# Patient Record
Sex: FEMALE | Race: WHITE
[De-identification: names, ages, dates, MRNs, and addresses within clinical notes are randomized per-mention and may not be internally consistent; named-entity substitution may affect disease eponyms.]

---

## 2017-02-19 ENCOUNTER — HOSPITAL ENCOUNTER (INPATIENT)
Dept: HOSPITAL 61 - ER | Age: 62
LOS: 1 days | Discharge: HOME | DRG: 313 | End: 2017-02-20
Attending: FAMILY MEDICINE | Admitting: FAMILY MEDICINE
Payer: COMMERCIAL

## 2017-02-19 VITALS — SYSTOLIC BLOOD PRESSURE: 125 MMHG | DIASTOLIC BLOOD PRESSURE: 80 MMHG

## 2017-02-19 VITALS — SYSTOLIC BLOOD PRESSURE: 143 MMHG | DIASTOLIC BLOOD PRESSURE: 92 MMHG

## 2017-02-19 VITALS — DIASTOLIC BLOOD PRESSURE: 74 MMHG | SYSTOLIC BLOOD PRESSURE: 116 MMHG

## 2017-02-19 VITALS — DIASTOLIC BLOOD PRESSURE: 75 MMHG | SYSTOLIC BLOOD PRESSURE: 138 MMHG

## 2017-02-19 VITALS — HEIGHT: 66 IN | BODY MASS INDEX: 33.34 KG/M2 | WEIGHT: 207.44 LBS

## 2017-02-19 DIAGNOSIS — Z82.49: ICD-10-CM

## 2017-02-19 DIAGNOSIS — Z79.899: ICD-10-CM

## 2017-02-19 DIAGNOSIS — R07.89: Primary | ICD-10-CM

## 2017-02-19 DIAGNOSIS — J44.9: ICD-10-CM

## 2017-02-19 DIAGNOSIS — F17.210: ICD-10-CM

## 2017-02-19 DIAGNOSIS — Z79.82: ICD-10-CM

## 2017-02-19 LAB
ALBUMIN SERPL-MCNC: 3.9 G/DL (ref 3.4–5)
ALBUMIN/GLOB SERPL: 1 {RATIO} (ref 1–1.7)
ALP SERPL-CCNC: 83 U/L (ref 46–116)
ALT SERPL-CCNC: 40 U/L (ref 14–59)
ANION GAP SERPL CALC-SCNC: 10 MMOL/L (ref 6–14)
AST SERPL-CCNC: 21 U/L (ref 15–37)
BASOPHILS # BLD AUTO: 0.1 X10^3/UL (ref 0–0.2)
BASOPHILS NFR BLD: 1 % (ref 0–3)
BILIRUB SERPL-MCNC: 0.3 MG/DL (ref 0.2–1)
BUN SERPL-MCNC: 13 MG/DL (ref 7–20)
BUN/CREAT SERPL: 16 (ref 6–20)
CALCIUM SERPL-MCNC: 10 MG/DL (ref 8.5–10.1)
CHLORIDE SERPL-SCNC: 102 MMOL/L (ref 98–107)
CO2 SERPL-SCNC: 29 MMOL/L (ref 21–32)
CREAT SERPL-MCNC: 0.8 MG/DL (ref 0.6–1)
EOSINOPHIL NFR BLD: 2 % (ref 0–3)
ERYTHROCYTE [DISTWIDTH] IN BLOOD BY AUTOMATED COUNT: 14.4 % (ref 11.5–14.5)
GFR SERPLBLD BASED ON 1.73 SQ M-ARVRAT: 72.9 ML/MIN
GLOBULIN SER-MCNC: 4.1 G/DL (ref 2.2–3.8)
GLUCOSE SERPL-MCNC: 119 MG/DL (ref 70–99)
HCT VFR BLD CALC: 44 % (ref 36–47)
HGB BLD-MCNC: 14.7 G/DL (ref 12–15.5)
LYMPHOCYTES # BLD: 3.4 X10^3/UL (ref 1–4.8)
LYMPHOCYTES NFR BLD AUTO: 37 % (ref 24–48)
MCH RBC QN AUTO: 29 PG (ref 25–35)
MCHC RBC AUTO-ENTMCNC: 34 G/DL (ref 31–37)
MCV RBC AUTO: 86 FL (ref 79–100)
MONOCYTES NFR BLD: 11 % (ref 0–9)
NEUTROPHILS NFR BLD AUTO: 49 % (ref 31–73)
PLATELET # BLD AUTO: 382 X10^3/UL (ref 140–400)
POTASSIUM SERPL-SCNC: 4 MMOL/L (ref 3.5–5.1)
PROT SERPL-MCNC: 8 G/DL (ref 6.4–8.2)
RBC # BLD AUTO: 5.14 X10^6/UL (ref 3.5–5.4)
SODIUM SERPL-SCNC: 141 MMOL/L (ref 136–145)
WBC # BLD AUTO: 9.2 X10^3/UL (ref 4–11)

## 2017-02-19 PROCEDURE — 71010: CPT

## 2017-02-19 PROCEDURE — 93005 ELECTROCARDIOGRAM TRACING: CPT

## 2017-02-19 PROCEDURE — A9500 TC99M SESTAMIBI: HCPCS

## 2017-02-19 PROCEDURE — 83880 ASSAY OF NATRIURETIC PEPTIDE: CPT

## 2017-02-19 PROCEDURE — 96376 TX/PRO/DX INJ SAME DRUG ADON: CPT

## 2017-02-19 PROCEDURE — 85027 COMPLETE CBC AUTOMATED: CPT

## 2017-02-19 PROCEDURE — 78452 HT MUSCLE IMAGE SPECT MULT: CPT

## 2017-02-19 PROCEDURE — 96374 THER/PROPH/DIAG INJ IV PUSH: CPT

## 2017-02-19 PROCEDURE — 80053 COMPREHEN METABOLIC PANEL: CPT

## 2017-02-19 PROCEDURE — 93017 CV STRESS TEST TRACING ONLY: CPT

## 2017-02-19 PROCEDURE — 84484 ASSAY OF TROPONIN QUANT: CPT

## 2017-02-19 PROCEDURE — 36415 COLL VENOUS BLD VENIPUNCTURE: CPT

## 2017-02-19 RX ADMIN — MORPHINE SULFATE PRN MG: 2 INJECTION, SOLUTION INTRAMUSCULAR; INTRAVENOUS at 17:42

## 2017-02-19 RX ADMIN — MORPHINE SULFATE PRN MG: 2 INJECTION, SOLUTION INTRAMUSCULAR; INTRAVENOUS at 13:25

## 2017-02-19 RX ADMIN — MORPHINE SULFATE PRN MG: 2 INJECTION, SOLUTION INTRAMUSCULAR; INTRAVENOUS at 23:54

## 2017-02-19 NOTE — EKG
[address] [place] [address]

Test Date:    2017               Test Time:    06:53:07

Pat Name:     YESI ANDERS            Department:   

Patient ID:   [id number]           Room:          

Gender:       F                        Technician:   

:          1955               Requested By: KRISTOFER CORONEL

Order Number: [id number]            Reading MD:     

                                 Measurements

Intervals                              Axis          

Rate:         87                       P:            -90

TN:           116                      QRS:          32

QRSD:         96                       T:            52

QT:           350                                    

QTc:          427                                    

                           Interpretive Statements

SINUS RHYTHM

QRS(T) CONTOUR ABNORMALITY

CONSIDER ANTEROSEPTAL MYOCARDIAL DAMAGE

POSSIBLY ABNORMAL ECG

RI6.01

No previous ECG available for comparison

## 2017-02-19 NOTE — PHYS DOC
Past Medical History


Past Medical History:  No Pertinent History


Past Surgical History:  


Alcohol Use:  Rarely


Drug Use:  None





Adult General


Chief Complaint


Chief Complaint:  CHEST WALL PAIN





HPI


HPI


62-year-old female smoker presents with a 2 day history of chest pain. She 

states initially the pain started near her left shoulder blade [place] now 

radiating to her anterior chest. She describes the pain as a tightness. She 

states the pain is an 8 out of 10 in intensity. The pain has been constant for 

2 days. She denies any fever chills sweats. She denies any hemoptysis. She 

denies any exertional dyspnea. []





Review of Systems


Review of Systems





Constitutional: Denies fever or chills []


Eyes: Denies change in visual acuity, redness, or eye pain []


HENT: Denies nasal congestion or sore throat []


Respiratory: Denies cough or shortness of breath []


Cardiovascular: No additional information not addressed in HPI []


GI: Denies abdominal pain, nausea, vomiting, bloody stools or diarrhea []


: Denies dysuria or hematuria []


Musculoskeletal: Denies back pain or joint pain []


Integument: Denies rash or skin lesions []


Neurologic: Denies headache, focal weakness or sensory changes []


Endocrine: Denies polyuria or polydipsia []





Current Medications


Current Medications





 Current Medications








 Medications


  (Trade)  Dose


 Ordered  Sig/Soham  Start Time


 Stop Time Status Last Admin


Dose Admin


 


 Aspirin


  (Children'S


 Aspirin)  324 mg  1X  ONCE  17 07:15


 17 07:16 DC 17 07:10


324 MG











Allergies


Allergies





 Allergies








Coded Allergies Type Severity Reaction Last Updated Verified


 


  No Known Drug Allergies    17 No











Physical Exam


Physical Exam





Constitutional: Well developed, well nourished, no acute distress, non-toxic 

appearance. []


HENT: Normocephalic, atraumatic, bilateral external ears normal, oropharynx 

moist, no oral exudates, nose normal. []


Eyes: PERRLA, EOMI, conjunctiva normal, no discharge. [] 


Neck: Normal range of motion, no tenderness, supple, no stridor. [] 


Cardiovascular:Heart rate regular rhythm, no murmur []


Lungs & Thorax:  Bilateral breath sounds clear to auscultation []


Abdomen: Bowel sounds normal, soft, no tenderness, no masses, no pulsatile 

masses. [] 


Skin: Warm, dry, no erythema, no rash. [] 


Back: No tenderness, no CVA tenderness. [] 


Extremities: No tenderness, no cyanosis, no clubbing, ROM intact, no edema. [] 


Neurologic: Alert and oriented X 3, normal motor function, normal sensory 

function, no focal deficits noted. []


Psychologic: Affect normal, judgement normal, mood normal. []





Current Patient Data


Vital Signs





 Vital Signs








  Date Time  Temp Pulse Resp B/P Pulse Ox O2 Delivery O2 Flow Rate FiO2


 


17 07:11  75 20 162/74 97   


 


17 06:50 98.4     Room Air  





 98.4       








Lab Values





 Laboratory Tests








Test


  17


06:10


 


White Blood Count


  9.2x10^3/uL


(4.0-11.0)


 


Red Blood Count


  5.14x10^6/uL


(3.50-5.40)


 


Hemoglobin


  14.7g/dL


(12.0-15.5)


 


Hematocrit


  44.0%


(36.0-47.0)


 


Mean Corpuscular Volume 86fL ()  


 


Mean Corpuscular Hemoglobin 29pg (25-35)  


 


Mean Corpuscular Hemoglobin


Concent 34g/dL (31-37)


 


 


Red Cell Distribution Width


  14.4%


(11.5-14.5)


 


Platelet Count


  382x10^3/uL


(140-400)


 


Neutrophils (%) (Auto) 49% (31-73)  


 


Lymphocytes (%) (Auto) 37% (24-48)  


 


Monocytes (%) (Auto) 11% (0-9)  H


 


Eosinophils (%) (Auto) 2% (0-3)  


 


Basophils (%) (Auto) 1% (0-3)  


 


Neutrophils # (Auto)


  4.5x10^3uL


(1.8-7.7)


 


Lymphocytes # (Auto)


  3.4x10^3/uL


(1.0-4.8)


 


Monocytes # (Auto)


  1.0x10^3/uL


(0.0-1.1)


 


Eosinophils # (Auto)


  0.2x10^3/uL


(0.0-0.7)


 


Basophils # (Auto)


  0.1x10^3/uL


(0.0-0.2)


 


Sodium Level


  141mmol/L


(136-145)


 


Potassium Level


  4.0mmol/L


(3.5-5.1)


 


Chloride Level


  102mmol/L


()


 


Carbon Dioxide Level


  29mmol/L


(21-32)


 


Anion Gap 10 (6-14)  


 


Blood Urea Nitrogen


  13mg/dL (7-20)


 


 


Creatinine


  0.8mg/dL


(0.6-1.0)


 


Estimated GFR


(Cockcroft-Gault) 72.9  


 


 


BUN/Creatinine Ratio 16 (6-20)  


 


Glucose Level


  119mg/dL


(70-99)  H


 


Calcium Level


  10.0mg/dL


(8.5-10.1)


 


Total Bilirubin


  0.3mg/dL


(0.2-1.0)


 


Aspartate Amino Transferase


(AST) 21U/L (15-37)  


 


 


Alanine Aminotransferase (ALT) 40U/L (14-59)  


 


Alkaline Phosphatase


  83U/L ()


 


 


Troponin I Quantitative


  < 0.017ng/mL


(0.000-0.055)


 


NT-Pro-B-Type Natriuretic


Peptide 47pg/mL


(0-124)


 


Total Protein


  8.0g/dL


(6.4-8.2)


 


Albumin


  3.9g/dL


(3.4-5.0)


 


Albumin/Globulin Ratio 1.0 (1.0-1.7)  





 Laboratory Tests


17 06:10








 Laboratory Tests


17 06:10














EKG


EKG


[EKG: Normal sinus rhythm rate of 87 without ischemic ST-T changes]





Radiology/Procedures


Radiology/Procedures


[]


Impressions:


PROCEDURE: CHEST AP ONLY











Indication chest pain.





A single view of the chest was obtained and is compared to an examination


10/11/2009.





The heart and pulmonary vessels appear normal. The lungs are clear. There has


not been a significant change when compared to the previous exam.





IMPRESSION: No acute finding. No significant change





Course & Med Decision Making


Course & Med Decision Making


Pertinent Labs and Imaging studies reviewed. (See chart for details)





[ED course: Evaluation reveals 62-year-old female with chest discomfort that is 

concerning for potential coronary origin. Her initial troponin chest x-ray and 

EKG were unremarkable. I still think it's in the patient's best interest to 

remain in the hospital or further evaluation and definitive testing. I spoke 

with Dr. Bessie Beard who agreed and will accept her for admission. Patient's 

primary care physician requests a consult by Dr. Neri Hi.]





Dragon Disclaimer


Dragon Disclaimer


This electronic medical record was generated, in whole or in part, using a 

voice recognition dictation system.





Departure


Departure


Impression:  


 Primary Impression:  


 Chest pain


Disposition:   ADMITTED AS INPATIENT


Admitting Physician:  Bessie Beard


Condition:  STABLE


Referrals:  


BESSIE BEARD MD (PCP)





Problem Qualifiers








 Primary Impression:  


 Chest pain


 Chest pain type:  unspecified  Qualified Code:  R07.9 - Chest pain, unspecified





KRISTOFER CORONEL DO 2017 09:21

## 2017-02-19 NOTE — RAD
Indication chest pain.



A single view of the chest was obtained and is compared to an examination

10/11/2009.



The heart and pulmonary vessels appear normal. The lungs are clear. There has

not been a significant change when compared to the previous exam.



IMPRESSION: No acute finding. No significant change

## 2017-02-19 NOTE — PDOC2
CONSULT


Date of Consult


Date of Consult


DATE: 2/19/17 


TIME: 15:36





Reason for Consult


Reason for Consult:


Chest pain





Referring Physician


Referring Physician:


Dr. Beard





Identification/Chief Complaint


Chief Complaint


Chest pain





History of Present Illness


Reason for Visit:


As patient is a 62-year-old lady that has no prior history of heart disease.





She is a smoker.





For about 2 days the patient has been experiencing some pain but she states it 

started on her back and then comes around to the front anteriorly on the left 

side of the chest it had been constant for almost 2 days.





The pain was relieved after pain medication was given.





The patient's EKG did not show any acute ST abnormality and the enzymes have 

been negative.





She was not in acute distress at the time that I examined her.





Past Medical History


Pulmonary:  COPD





Current Problem List


Problem List


 Problems


Medical Problems:


(1) Chest pain


Status: Acute  











Current Medications


Current Medications





 Current Medications


Aspirin (Children'S Aspirin) 324 mg 1X  ONCE PO  Last administered on 2/19/17at 

07:10;  Start 2/19/17 at 07:15;  Stop 2/19/17 at 07:16;  Status DC


Ondansetron HCl (Zofran) 4 mg PRN Q8HRS  PRN IV NAUSEA/VOMITING;  Start 2/19/17 

at 09:30;  Stop 2/20/17 at 09:29


Morphine Sulfate 2 mg PRN Q2HR  PRN IV PAIN Last administered on 2/19/17at 13:25

;  Start 2/19/17 at 09:30;  Stop 2/20/17 at 09:29


Acetaminophen (Tylenol) 650 mg PRN Q4HRS  PRN PO FEVER;  Start 2/19/17 at 09:30

;  Stop 2/20/17 at 09:29





Allergies


Allergies:  


Coded Allergies:  


     No Known Drug Allergies (Unverified , 2/19/17)





Physical Exam


General:  Alert, Oriented X3, Cooperative


HEENT:  Atraumatic, PERRLA


Lungs:  Clear to auscultation


Heart:  Regular rate, Normal S1, Normal S2, No murmurs


Abdomen:  Normal bowel sounds, Soft


Extremities:  No edema


Psych/Mental Status:  Mental status NL





Vitals


VITALS





 Vital Signs








  Date Time  Temp Pulse Resp B/P Pulse Ox O2 Delivery O2 Flow Rate FiO2


 


2/19/17 15:00 97.1 68 20 138/75 97 Room Air  





 97.1       











Labs


Labs





Laboratory Tests








Test


  2/19/17


06:10


 


White Blood Count


  9.2x10^3/uL


(4.0-11.0)


 


Red Blood Count


  5.14x10^6/uL


(3.50-5.40)


 


Hemoglobin


  14.7g/dL


(12.0-15.5)


 


Hematocrit


  44.0%


(36.0-47.0)


 


Mean Corpuscular Volume 86fL () 


 


Mean Corpuscular Hemoglobin 29pg (25-35) 


 


Mean Corpuscular Hemoglobin


Concent 34g/dL (31-37) 


 


 


Red Cell Distribution Width


  14.4%


(11.5-14.5)


 


Platelet Count


  382x10^3/uL


(140-400)


 


Neutrophils (%) (Auto) 49% (31-73) 


 


Lymphocytes (%) (Auto) 37% (24-48) 


 


Monocytes (%) (Auto) 11% (0-9) 


 


Eosinophils (%) (Auto) 2% (0-3) 


 


Basophils (%) (Auto) 1% (0-3) 


 


Neutrophils # (Auto)


  4.5x10^3uL


(1.8-7.7)


 


Lymphocytes # (Auto)


  3.4x10^3/uL


(1.0-4.8)


 


Monocytes # (Auto)


  1.0x10^3/uL


(0.0-1.1)


 


Eosinophils # (Auto)


  0.2x10^3/uL


(0.0-0.7)


 


Basophils # (Auto)


  0.1x10^3/uL


(0.0-0.2)


 


Sodium Level


  141mmol/L


(136-145)


 


Potassium Level


  4.0mmol/L


(3.5-5.1)


 


Chloride Level


  102mmol/L


()


 


Carbon Dioxide Level


  29mmol/L


(21-32)


 


Anion Gap 10 (6-14) 


 


Blood Urea Nitrogen 13mg/dL (7-20) 


 


Creatinine


  0.8mg/dL


(0.6-1.0)


 


Estimated GFR


(Cockcroft-Gault) 72.9 


 


 


BUN/Creatinine Ratio 16 (6-20) 


 


Glucose Level


  119mg/dL


(70-99)


 


Calcium Level


  10.0mg/dL


(8.5-10.1)


 


Total Bilirubin


  0.3mg/dL


(0.2-1.0)


 


Aspartate Amino Transf


(AST/SGOT) 21U/L (15-37) 


 


 


Alanine Aminotransferase


(ALT/SGPT) 40U/L (14-59) 


 


 


Alkaline Phosphatase 83U/L () 


 


Troponin I Quantitative


  < 0.017ng/mL


(0.000-0.055)


 


NT-Pro-B-Type Natriuretic


Peptide 47pg/mL


(0-124)


 


Total Protein


  8.0g/dL


(6.4-8.2)


 


Albumin


  3.9g/dL


(3.4-5.0)


 


Albumin/Globulin Ratio 1.0 (1.0-1.7) 








Laboratory Tests








Test


  2/19/17


06:10


 


White Blood Count


  9.2x10^3/uL


(4.0-11.0)


 


Red Blood Count


  5.14x10^6/uL


(3.50-5.40)


 


Hemoglobin


  14.7g/dL


(12.0-15.5)


 


Hematocrit


  44.0%


(36.0-47.0)


 


Mean Corpuscular Volume 86fL () 


 


Mean Corpuscular Hemoglobin 29pg (25-35) 


 


Mean Corpuscular Hemoglobin


Concent 34g/dL (31-37) 


 


 


Red Cell Distribution Width


  14.4%


(11.5-14.5)


 


Platelet Count


  382x10^3/uL


(140-400)


 


Neutrophils (%) (Auto) 49% (31-73) 


 


Lymphocytes (%) (Auto) 37% (24-48) 


 


Monocytes (%) (Auto) 11% (0-9) 


 


Eosinophils (%) (Auto) 2% (0-3) 


 


Basophils (%) (Auto) 1% (0-3) 


 


Neutrophils # (Auto)


  4.5x10^3uL


(1.8-7.7)


 


Lymphocytes # (Auto)


  3.4x10^3/uL


(1.0-4.8)


 


Monocytes # (Auto)


  1.0x10^3/uL


(0.0-1.1)


 


Eosinophils # (Auto)


  0.2x10^3/uL


(0.0-0.7)


 


Basophils # (Auto)


  0.1x10^3/uL


(0.0-0.2)


 


Sodium Level


  141mmol/L


(136-145)


 


Potassium Level


  4.0mmol/L


(3.5-5.1)


 


Chloride Level


  102mmol/L


()


 


Carbon Dioxide Level


  29mmol/L


(21-32)


 


Anion Gap 10 (6-14) 


 


Blood Urea Nitrogen 13mg/dL (7-20) 


 


Creatinine


  0.8mg/dL


(0.6-1.0)


 


Estimated GFR


(Cockcroft-Gault) 72.9 


 


 


BUN/Creatinine Ratio 16 (6-20) 


 


Glucose Level


  119mg/dL


(70-99)


 


Calcium Level


  10.0mg/dL


(8.5-10.1)


 


Total Bilirubin


  0.3mg/dL


(0.2-1.0)


 


Aspartate Amino Transf


(AST/SGOT) 21U/L (15-37) 


 


 


Alanine Aminotransferase


(ALT/SGPT) 40U/L (14-59) 


 


 


Alkaline Phosphatase 83U/L () 


 


Troponin I Quantitative


  < 0.017ng/mL


(0.000-0.055)


 


NT-Pro-B-Type Natriuretic


Peptide 47pg/mL


(0-124)


 


Total Protein


  8.0g/dL


(6.4-8.2)


 


Albumin


  3.9g/dL


(3.4-5.0)


 


Albumin/Globulin Ratio 1.0 (1.0-1.7) 











Assessment/Plan


Assessment/Plan


This patient comes in with an atypical chest pains and so far she has been 

ruled out.





Since she is a smoker I would like to get a stress MPI in the morning.





Thank you very much for asking me to participate in the care of this patient








YASIR AVALOS MD Feb 19, 2017 15:40

## 2017-02-20 VITALS — DIASTOLIC BLOOD PRESSURE: 84 MMHG | SYSTOLIC BLOOD PRESSURE: 136 MMHG

## 2017-02-20 VITALS — DIASTOLIC BLOOD PRESSURE: 80 MMHG | SYSTOLIC BLOOD PRESSURE: 141 MMHG

## 2017-02-20 VITALS
SYSTOLIC BLOOD PRESSURE: 142 MMHG | SYSTOLIC BLOOD PRESSURE: 142 MMHG | SYSTOLIC BLOOD PRESSURE: 142 MMHG | DIASTOLIC BLOOD PRESSURE: 95 MMHG | DIASTOLIC BLOOD PRESSURE: 95 MMHG | SYSTOLIC BLOOD PRESSURE: 142 MMHG | DIASTOLIC BLOOD PRESSURE: 95 MMHG | DIASTOLIC BLOOD PRESSURE: 95 MMHG

## 2017-02-20 NOTE — DS
DATE OF DISCHARGE:  02/20/2017



HOSPITAL SUMMARY:  A 62-year-old white female came in with some left-sided upper

back and upper chest pain that sounded noncardiac in nature.  EKGs and cardiac

enzymes and laboratory studies were normal as was chest x-ray and EKG.  Dr. Hi performed exercise MPI, which was all within normal limits and she was

comfortable to be followed as an outpatient at this point.



FINAL DIAGNOSIS:  Noncardiac chest pain.



OPERATIONS, PROCEDURES, COMPLICATIONS:  None.



CONSULTATIONS:  Dr. Hi.



DISPOSITION:  Discharged home.  No new medications.  We will evaluate regarding

possible cervical source of pain if it persists and will see on a p.r.n. basis.

 



______________________________

BESSIE SAINZ MD



DR:  EARL/nts  JOB#:  [id number] / [id number]

DD:  02/20/2017 13:23  DT:  02/20/2017 23:29

## 2017-02-20 NOTE — RAD
--------------- APPROVED REPORT --------------





Test Type:          Exercise

Stress Nurse/Tech: Franchesca Vasquez R.N.

Test Indications: chest pain

Cardiac History: smoker

Medications:     see ehr

Medical History: see ehr

Resting ECG:     sr

Resting Heart Rate: 75 bpm

Resting Blood Pressure: 153/86mmHg

Pretest Chest Pain: No chest pain



Nurse/Tech Notes

lungs cta-diminished, norrmal heart tones

Consent: The procedure was explained to the patient in lay terms. Informed consent was witnessed. Frandy
eout was entered into [employer]. History and Stress Test performed by Franchesca Vasquez R.N.



Stress Symptoms

No chest pain or symptoms.



POST EXERCISE

Reason for Termination: Reached target heart rate

Target HR: Yes

Max HR: 154 bpm

96% of Maximum Predicted HR: 159 bpm

Exercise duration: 3:00 min:sec, 1 Stage

Exercise capacity: 4.6METs

Max Blood Pressure: 163/82mmHg

Blood Pressure response to exercise: Normal blood pressure response during stress.

Heart Rate response to exercise: normal

Chest Pain: No. 

Arrhythmia: Yes. pacs

ST Change: No. 



Imaging Protocol

IMAGE PROTOCOL: Rest Tc-99m/stress Tc-99m 1 day



Rest:            Stress:         Viability:   

Radiopharm.Tc99m LloyvefjaEf45m Sestamibi

Dose10.5mCi            35mCi            

Duration    15min.           10min.           

Img Date  02/20/2017 02/20/2017      

Inj-Img Tbii79qpg.           60min.           



Rest Admin Site:IV - Left AntecubitalAdministrator:Isidra Tobar, RT (R)(N)

Stress Admin Site: IV - Left AntecubitalAdministrator: JETHRO Hansen, ARRT (R)(N)



STRESS DATA

End Diast. Vol.67.0mlAv. Heart Rate86.0bpm

End Syst. Vol.9.0mlCO Index BSA0.0L/min

Myocardial Pjlr865.0gEject. Nljqidjo03.0%



Stress Rates

Pk. Fill Rate4.34EDV/secLVtime Pk. Fill 190.74msec

Pk. Empty Rate6.02ESV/secLVtime Pk. Jxbvl415.40msec

1/3 Pk. Fill1.67EDV/sec



Stress Scores

Regional WT0.00Summed WT0.00

Regional WM0.00Summed WM0.00



LV Perf. Quant

17 Seg. SSS1.00

17 Seg. SRS1.00

17 Seg. SDS1.00

Stress Defect Extent (% LAD)0.00Rest Defect Extent (% LAD)0.00Rev. Defect Extent (% LAD)0.00

Stress Defect Extent (% LCX) 7.50Rest Defect Extent (% LCX)2.50Rev. Defect Extent (% LCX)7.50

Stress Defect Extent (% RCA)0.00Rest Defect Extent (% RCA)0.00Rev. Defect Extent (% RCA)0.00

Stress Defect Extent (% DAYSI)1.30Rest Defect Extent (% DAYSI)0.40Rev. Defect Extent (% DAYSI)1.30



Conclusion

1. Excercised patient on the Bandar  Protocol for a total of 3 mins

2. .This shows low physical tolerance.

3.  Attained a heart rate of 154 bpm which is the maximum predicted heart rate for age

4. No significant perfusiobn defects to suggest myocardial ischemia or scar

5. Normal wall motion and wall thickening with an ejection fraction of 80%.

6. Because she excercised only for 3 mins. this tesy may not be valid.

7. Consider pharmacological stressi if  clinical suspicion for CAD is high.

8. .

## 2017-02-20 NOTE — PDOC
PROGRESS NOTES


Subjective


Subjective


Has continued to have a few intermittent episodes of chest discomfort that last 

only briefly and are less sever than when she presented.  No other complaints.  

Stress MPI today.





Objective


Objective





 Vital Signs








  Date Time  Temp Pulse Resp B/P Pulse Ox O2 Delivery O2 Flow Rate FiO2


 


2/20/17 11:08   20  96 Room Air  


 


2/20/17 11:02 97.6 79  142/95    





 97.6       














 Intake and Output 


 


 2/20/17





 07:00


 


Intake Total 1320 ml


 


Balance 1320 ml


 


 


 


Intake Oral 1320 ml


 


# Voids 4











Physical Exam


Abdomen:  Normal bowel sounds, Soft


Heart:  Regular rate, Normal S1, Normal S2, No murmurs


Extremities:  No clubbing, No cyanosis, No edema


General:  Alert, Cooperative, No acute distress


Lungs:  Clear to auscultation


Neuro:  Other (No gross focal deficits)





Assessment


Assessment


Continued chest pain, MPI was essentially normal with EF > 80%.  Chest pain is 

likely non cardiac in origin as ECG, troponins and MPI have been negative.





 Problems


Medical Problems:


(1) Chest pain


Status: Acute  








Plan


Plan of Care


Patient is stable from a cardiac standpoint for discharge.  Thank you for 

including me in the care of this patient.





Comment


Review of Relevant


I have reviewed the following items mikel (where applicable) has been applied.


Labs





Laboratory Tests








Test


  2/19/17


06:10 2/19/17


15:10 2/19/17


21:07


 


White Blood Count


  9.2x10^3/uL


(4.0-11.0) 


  


 


 


Red Blood Count


  5.14x10^6/uL


(3.50-5.40) 


  


 


 


Hemoglobin


  14.7g/dL


(12.0-15.5) 


  


 


 


Hematocrit


  44.0%


(36.0-47.0) 


  


 


 


Mean Corpuscular Volume 86fL ()   


 


Mean Corpuscular Hemoglobin 29pg (25-35)   


 


Mean Corpuscular Hemoglobin


Concent 34g/dL (31-37) 


  


  


 


 


Red Cell Distribution Width


  14.4%


(11.5-14.5) 


  


 


 


Platelet Count


  382x10^3/uL


(140-400) 


  


 


 


Neutrophils (%) (Auto) 49% (31-73)   


 


Lymphocytes (%) (Auto) 37% (24-48)   


 


Monocytes (%) (Auto) 11% (0-9)   


 


Eosinophils (%) (Auto) 2% (0-3)   


 


Basophils (%) (Auto) 1% (0-3)   


 


Neutrophils # (Auto)


  4.5x10^3uL


(1.8-7.7) 


  


 


 


Lymphocytes # (Auto)


  3.4x10^3/uL


(1.0-4.8) 


  


 


 


Monocytes # (Auto)


  1.0x10^3/uL


(0.0-1.1) 


  


 


 


Eosinophils # (Auto)


  0.2x10^3/uL


(0.0-0.7) 


  


 


 


Basophils # (Auto)


  0.1x10^3/uL


(0.0-0.2) 


  


 


 


Sodium Level


  141mmol/L


(136-145) 


  


 


 


Potassium Level


  4.0mmol/L


(3.5-5.1) 


  


 


 


Chloride Level


  102mmol/L


() 


  


 


 


Carbon Dioxide Level


  29mmol/L


(21-32) 


  


 


 


Anion Gap 10 (6-14)   


 


Blood Urea Nitrogen 13mg/dL (7-20)   


 


Creatinine


  0.8mg/dL


(0.6-1.0) 


  


 


 


Estimated GFR


(Cockcroft-Gault) 72.9 


  


  


 


 


BUN/Creatinine Ratio 16 (6-20)   


 


Glucose Level


  119mg/dL


(70-99) 


  


 


 


Calcium Level


  10.0mg/dL


(8.5-10.1) 


  


 


 


Total Bilirubin


  0.3mg/dL


(0.2-1.0) 


  


 


 


Aspartate Amino Transf


(AST/SGOT) 21U/L (15-37) 


  


  


 


 


Alanine Aminotransferase


(ALT/SGPT) 40U/L (14-59) 


  


  


 


 


Alkaline Phosphatase 83U/L ()   


 


Troponin I Quantitative


  < 0.017ng/mL


(0.000-0.055) < 0.017ng/mL


(0.000-0.055) < 0.017ng/mL


(0.000-0.055)


 


NT-Pro-B-Type Natriuretic


Peptide 47pg/mL


(0-124) 


  


 


 


Total Protein


  8.0g/dL


(6.4-8.2) 


  


 


 


Albumin


  3.9g/dL


(3.4-5.0) 


  


 


 


Albumin/Globulin Ratio 1.0 (1.0-1.7)   








Laboratory Tests








Test


  2/19/17


15:10 2/19/17


21:07


 


Troponin I Quantitative


  < 0.017ng/mL


(0.000-0.055) < 0.017ng/mL


(0.000-0.055)








Medications





 Current Medications


Aspirin (Children'S Aspirin) 324 mg 1X  ONCE PO  Last administered on 2/19/17at 

07:10;  Start 2/19/17 at 07:15;  Stop 2/19/17 at 07:16;  Status DC


Ondansetron HCl (Zofran) 4 mg PRN Q8HRS  PRN IV NAUSEA/VOMITING;  Start 2/19/17 

at 09:30;  Stop 2/20/17 at 09:29;  Status DC


Morphine Sulfate 2 mg PRN Q2HR  PRN IV PAIN Last administered on 2/19/17at 23:54

;  Start 2/19/17 at 09:30;  Stop 2/20/17 at 09:29;  Status DC


Acetaminophen (Tylenol) 650 mg PRN Q4HRS  PRN PO FEVER Last administered on 2/20 /17at 05:15;  Start 2/19/17 at 09:30;  Stop 2/20/17 at 09:29;  Status DC


Morphine Sulfate 2 mg PRN Q2HR  PRN IV PAIN Last administered on 2/20/17at 11:08

;  Start 2/20/17 at 11:00


Ondansetron HCl (Zofran) 4 mg PRN Q6HRS  PRN IV NAUSEA/VOMITING;  Start 2/20/17 

at 11:00


Acetaminophen (Tylenol) 650 mg PRN Q4HRS  PRN PO MILD PAIN / TEMP;  Start 2/20/ 17 at 11:00


Vitals/I & O





 Vital Sign - Last 24 Hours








 2/19/17 2/19/17 2/19/17 2/19/17





 15:00 19:00 20:00 22:49


 


Temp 97.1 98.0  97.6





 97.1 98.0  97.6


 


Pulse 68 75  70


 


Resp 20 18  18


 


B/P 138/75 116/74  125/80


 


Pulse Ox 97 100  93


 


O2 Delivery Room Air Room Air Room Air Room Air


 


    





    





 2/19/17 2/20/17 2/20/17 2/20/17





 23:54 00:24 03:24 07:33


 


Temp   97.7 97.7





   97.7 97.7


 


Pulse   69 66


 


Resp 20 20 18 18


 


B/P   136/84 141/80


 


Pulse Ox   97 96


 


O2 Delivery Room Air Room Air Room Air Room Air


 


    





    





 2/20/17 2/20/17 2/20/17 





 08:00 11:02 11:08 


 


Temp  97.6  





  97.6  


 


Pulse  79  


 


Resp  18 20 


 


B/P  142/95  


 


Pulse Ox  96 96 


 


O2 Delivery Room Air Room Air Room Air 














 Intake and Output   


 


 2/19/17 2/19/17 2/20/17





 15:00 23:00 07:00


 


Intake Total 120 ml 800 ml 400 ml


 


Balance 120 ml 800 ml 400 ml














YASIR AVALOS MD Feb 20, 2017 12:52

## 2017-02-20 NOTE — PDOC
Provider Note


Provider Note


suspect cervical sourc of pain, mpi   [id number]








BESSIE SAINZ MD Feb 20, 2017 08:23

## 2017-02-20 NOTE — HP
ADMIT DATE:  



CHIEF COMPLAINT:  Left upper chest pain.



HISTORY OF PRESENT ILLNESS:  A 62-year-old white female new patient to me, has

no medical history and takes no meds.  She began having some aching discomfort

in her left upper back and left anterior chest.  About 2 days prior to

admission, which appeared to be more present at rest or lying down and less

present when walking or moving about.  There was some pain in left arm as well

with a sensation of little bit of tingling at times as well.  She recalls no

injury, but does a lot of heavy lifting at work.  She has had no fever, chills,

cough, urinary symptoms, GI symptoms or other complaints.  EKGs and serial

enzymes have been negative so far and she has an MPI scheduled.



PAST MEDICAL HISTORY:  Surgically, she has had C-sections.  She is on no

prescription medications.



ALLERGIES:  No known drug allergies.



No other serious medical problems.



SOCIAL HISTORY:  Half pack a day smoker, works in surgery, , light

drinker.



FAMILY HISTORY:  Strongly positive for hypertension in all of her siblings,

otherwise unremarkable.



REVIEW OF SYSTEMS:  No other complaints.



OBJECTIVE:

ENT:  All within normal limits.

NECK:  No bruits, masses or nodes.  Good range of motion.

LUNGS:  Clear.

CARDIOVASCULAR:  Regular rate.  No irregular beat, rub, or murmur.

BACK:  Nontender with no ____.  No lesions for herpes zoster.  No flank

tenderness.

EXTREMITIES:  Unremarkable with good radial and pedal pulses.  No joint or skin

lesions.

ABDOMEN:  Benign.

NEUROLOGIC:  Nonfocal.



ASSESSMENT:  Suspect musculoskeletal likely cervical radicular source of pain. 

She is a smoker and does have risk factors for heart disease ____.



PLAN:  The MPI for now.

 



______________________________

BESSIE SAINZ MD



DR:  EARL/becky  JOB#:  [id number] / [id number]

DD:  02/20/2017 08:23  DT:  02/20/2017 09:14

## 2018-02-12 ENCOUNTER — HOSPITAL ENCOUNTER (OUTPATIENT)
Dept: HOSPITAL 61 - LAB | Age: 63
Discharge: HOME | End: 2018-02-12
Attending: FAMILY MEDICINE
Payer: COMMERCIAL

## 2018-02-12 DIAGNOSIS — E78.5: ICD-10-CM

## 2018-02-12 DIAGNOSIS — Z13.1: Primary | ICD-10-CM

## 2018-02-12 DIAGNOSIS — Z13.220: ICD-10-CM

## 2018-02-12 LAB
ANION GAP SERPL CALC-SCNC: 9 MMOL/L (ref 6–14)
BLOOD UREA NITROGEN: 15 MG/DL (ref 7–20)
CALCIUM: 9.1 MG/DL (ref 8.5–10.1)
CHLORIDE: 100 MMOL/L (ref 98–107)
CHOLESTEROL/HDL RATIO: 5.6
CHOLESTEROL: 178 MG/DL (ref 0–200)
CO2 SERPL-SCNC: 28 MMOL/L (ref 21–32)
CREAT SERPL-MCNC: 0.8 MG/DL (ref 0.6–1)
GFR SERPLBLD BASED ON 1.73 SQ M-ARVRAT: 72.7 ML/MIN
GLUCOSE SERPL-MCNC: 117 MG/DL (ref 70–99)
HDLC: 32 MG/DL (ref 40–60)
LDLC: 124 MG/DL (ref 0–100)
NON-HDL CHOLESTEROL: 146 MG/DL (ref 0–129)
POTASSIUM SERPL-SCNC: 4 MMOL/L (ref 3.5–5.1)
SODIUM: 137 MMOL/L (ref 136–145)
TRIGLYCERIDES: 111 MG/DL (ref 0–150)
VLDLC: 22 MG/DL (ref 0–40)

## 2018-02-12 PROCEDURE — 80061 LIPID PANEL: CPT

## 2018-02-12 PROCEDURE — 36415 COLL VENOUS BLD VENIPUNCTURE: CPT

## 2018-02-12 PROCEDURE — 80048 BASIC METABOLIC PNL TOTAL CA: CPT

## 2018-02-14 ENCOUNTER — HOSPITAL ENCOUNTER (OUTPATIENT)
Dept: HOSPITAL 61 - MAMMO | Age: 63
Discharge: HOME | End: 2018-02-14
Attending: FAMILY MEDICINE
Payer: COMMERCIAL

## 2018-02-14 DIAGNOSIS — Z12.31: Primary | ICD-10-CM

## 2018-02-14 PROCEDURE — 77063 BREAST TOMOSYNTHESIS BI: CPT

## 2018-02-14 PROCEDURE — 77067 SCR MAMMO BI INCL CAD: CPT

## 2018-02-21 ENCOUNTER — HOSPITAL ENCOUNTER (OUTPATIENT)
Dept: HOSPITAL 61 - MAMMO | Age: 63
Discharge: HOME | End: 2018-02-21
Attending: FAMILY MEDICINE
Payer: COMMERCIAL

## 2018-02-21 DIAGNOSIS — R92.1: Primary | ICD-10-CM

## 2018-02-21 PROCEDURE — 77065 DX MAMMO INCL CAD UNI: CPT

## 2018-09-11 ENCOUNTER — HOSPITAL ENCOUNTER (OUTPATIENT)
Dept: HOSPITAL 61 - MAMMO | Age: 63
Discharge: HOME | End: 2018-09-11
Attending: FAMILY MEDICINE
Payer: COMMERCIAL

## 2018-09-11 DIAGNOSIS — Z82.49: ICD-10-CM

## 2018-09-11 DIAGNOSIS — J44.9: ICD-10-CM

## 2018-09-11 DIAGNOSIS — E78.5: ICD-10-CM

## 2018-09-11 DIAGNOSIS — F17.210: ICD-10-CM

## 2018-09-11 DIAGNOSIS — R92.0: Primary | ICD-10-CM

## 2018-09-11 DIAGNOSIS — Z79.899: ICD-10-CM

## 2018-09-11 DIAGNOSIS — Z79.82: ICD-10-CM

## 2018-09-11 PROCEDURE — 77065 DX MAMMO INCL CAD UNI: CPT

## 2018-09-11 NOTE — RAD
DATE: 9/11/2018



EXAM: MAMMO ZAHRA DIAG RT



HISTORY: Follow-up microcalcifications



COMPARISON: 2/21/2018, 2/14/2018



This study was interpreted with the benefit of Computerized Aided Detection

(CAD).





Breast Density: SCATTERED The breast parenchyma shows scattered fibroglandular

densities. Breast parenchyma level B.





FINDINGS: 2-D and 3-D tomosynthesis imaging was performed in CC and MLO

projections.  No new or enlarging breast densities are seen.  There is a

grouping of microcalcifications posteromedially in the right breast which is

unchanged.  These have a relatively benign appearance.  No new abnormality is

detected.  





IMPRESSION: Stable right breast microcalcifications.  Follow-up bilateral

mammography in 6 months is suggested.







BI-RADS CATEGORY: 3 PROBABLY BENIGN FINDING(S)-SHORT INTERVAL FOLLOW-UP

SUGGESTED



RECOMMENDED FOLLOW-UP: 6M 6 MONTH FOLLOW-UP



PQRS compliance statement: Patient information was entered into a reminder

system with a target due date     for the next mammogram.



Mammography is a sensitive method for finding small breast cancers, but it

does not detect them all and is not a substitute for careful clinical

examination.  A negative mammogram does not negate a clinically suspicious

finding and should not result in delay in biopsying a clinically suspicious

abnormality.



"Our facility is accredited by the American [employer] Mammography

Program."

## 2019-03-18 ENCOUNTER — HOSPITAL ENCOUNTER (OUTPATIENT)
Dept: HOSPITAL 61 - LAB | Age: 64
Discharge: HOME | End: 2019-03-18
Attending: FAMILY MEDICINE
Payer: COMMERCIAL

## 2019-03-18 DIAGNOSIS — E78.2: Primary | ICD-10-CM

## 2019-03-18 DIAGNOSIS — R73.03: ICD-10-CM

## 2019-03-18 LAB
ALBUMIN SERPL-MCNC: 3.4 G/DL (ref 3.4–5)
ALBUMIN/GLOB SERPL: 0.8 {RATIO} (ref 1–1.7)
ALP SERPL-CCNC: 106 U/L (ref 46–116)
ALT SERPL-CCNC: 70 U/L (ref 14–59)
ANION GAP SERPL CALC-SCNC: 9 MMOL/L (ref 6–14)
AST SERPL-CCNC: 35 U/L (ref 15–37)
BILIRUB SERPL-MCNC: 0.4 MG/DL (ref 0.2–1)
BUN SERPL-MCNC: 13 MG/DL (ref 7–20)
BUN/CREAT SERPL: 16 (ref 6–20)
CALCIUM SERPL-MCNC: 9.2 MG/DL (ref 8.5–10.1)
CHLORIDE SERPL-SCNC: 102 MMOL/L (ref 98–107)
CHOLEST SERPL-MCNC: 142 MG/DL (ref 0–200)
CHOLEST/HDLC SERPL: 3.4 {RATIO}
CO2 SERPL-SCNC: 29 MMOL/L (ref 21–32)
CREAT SERPL-MCNC: 0.8 MG/DL (ref 0.6–1)
GFR SERPLBLD BASED ON 1.73 SQ M-ARVRAT: 72.4 ML/MIN
GLOBULIN SER-MCNC: 4.5 G/DL (ref 2.2–3.8)
GLUCOSE SERPL-MCNC: 125 MG/DL (ref 70–99)
HBA1C MFR BLD: 6.2 % (ref 4.8–5.6)
HDLC SERPL-MCNC: 42 MG/DL (ref 40–60)
LDLC: 79 MG/DL (ref 0–100)
POTASSIUM SERPL-SCNC: 4.4 MMOL/L (ref 3.5–5.1)
PROT SERPL-MCNC: 7.9 G/DL (ref 6.4–8.2)
SODIUM SERPL-SCNC: 140 MMOL/L (ref 136–145)
TRIGL SERPL-MCNC: 104 MG/DL (ref 0–150)
VLDLC: 21 MG/DL (ref 0–40)

## 2019-03-18 PROCEDURE — 80061 LIPID PANEL: CPT

## 2019-03-18 PROCEDURE — 80053 COMPREHEN METABOLIC PANEL: CPT

## 2019-03-18 PROCEDURE — 36415 COLL VENOUS BLD VENIPUNCTURE: CPT

## 2019-03-18 PROCEDURE — 83036 HEMOGLOBIN GLYCOSYLATED A1C: CPT

## 2019-04-02 ENCOUNTER — HOSPITAL ENCOUNTER (OUTPATIENT)
Dept: HOSPITAL 61 - MAMMO | Age: 64
Discharge: HOME | End: 2019-04-02
Attending: FAMILY MEDICINE
Payer: COMMERCIAL

## 2019-04-02 DIAGNOSIS — R92.0: Primary | ICD-10-CM

## 2019-04-02 PROCEDURE — 77066 DX MAMMO INCL CAD BI: CPT

## 2019-04-02 NOTE — RAD
DATE: 4/2/2019



EXAM: MAMMO ZAHRA DIAG BILAT



HISTORY: Follow-up microcalcifications



COMPARISON: 9/11/2018, 2/21/2018, 2/14/2018



This study was interpreted with the benefit of Computerized Aided Detection

(CAD).





Breast Density: SCATTERED The breast parenchyma shows scattered fibroglandular

densities. Breast parenchyma level B.





FINDINGS: 2-D and 3-D tomosynthesis imaging was performed in CC and MLO

projections.  No new or enlarging breast densities are seen.  A grouping of

calcifications in the posteromedial aspect of the right breast is unchanged. 

There are scattered benign type calcifications are noted in both breasts.  





IMPRESSION: Stable right breast microcalcifications.  Further mammographic

surveillance consisting of right mammograms in 6 months and bilateral

mammography at one year is suggested.





BI-RADS CATEGORY: 3 PROBABLY BENIGN FINDING(S)-SHORT INTERVAL FOLLOW-UP

SUGGESTED



RECOMMENDED FOLLOW-UP: 6M 6 MONTH FOLLOW-UP



PQRS compliance statement: Patient information was entered into a reminder

system with a target due date     for the next mammogram.



Mammography is a sensitive method for finding small breast cancers, but it

does not detect them all and is not a substitute for careful clinical

examination.  A negative mammogram does not negate a clinically suspicious

finding and should not result in delay in biopsying a clinically suspicious

abnormality.



"Our facility is accredited by the American [employer] Mammography

Program."

## 2019-06-21 ENCOUNTER — HOSPITAL ENCOUNTER (OUTPATIENT)
Dept: HOSPITAL 61 - ENDOS | Age: 64
End: 2019-06-21
Attending: INTERNAL MEDICINE
Payer: COMMERCIAL

## 2019-06-21 VITALS — DIASTOLIC BLOOD PRESSURE: 62 MMHG | SYSTOLIC BLOOD PRESSURE: 138 MMHG

## 2019-06-21 DIAGNOSIS — K64.0: ICD-10-CM

## 2019-06-21 DIAGNOSIS — E78.5: ICD-10-CM

## 2019-06-21 DIAGNOSIS — K62.1: ICD-10-CM

## 2019-06-21 DIAGNOSIS — K21.9: ICD-10-CM

## 2019-06-21 DIAGNOSIS — Z87.891: ICD-10-CM

## 2019-06-21 DIAGNOSIS — Z79.899: ICD-10-CM

## 2019-06-21 DIAGNOSIS — M19.90: ICD-10-CM

## 2019-06-21 DIAGNOSIS — Z12.11: Primary | ICD-10-CM

## 2019-06-21 DIAGNOSIS — I10: ICD-10-CM

## 2019-06-21 DIAGNOSIS — Z98.890: ICD-10-CM

## 2019-06-21 PROCEDURE — 88305 TISSUE EXAM BY PATHOLOGIST: CPT

## 2019-06-21 PROCEDURE — 45380 COLONOSCOPY AND BIOPSY: CPT

## 2019-06-21 NOTE — CONS
DATE OF CONSULTATION:  2019



REFERRING PHYSICIAN:  Martell Chavez MD.



REASON FOR CONSULTATION:  Colorectal screening.



HISTORY OF PRESENT ILLNESS:  A 64-year-old  female with past medical

history significant for hyperlipidemia, status post C-sections x2, is seen for

screening colon exam.  Bowel habits are regular without diarrhea or

constipation.  There has been no melena and/or hematochezia.  Weight and

appetite are stable.  No family history of colon polyps or colon cancer.  She

states that has not undergone previous screening.  She is otherwise without

additional complaints.



PAST MEDICAL HISTORY:  GERD, arthritis, and hyperlipidemia.



ALLERGIES:  None.



MEDICATIONS:  Include atorvastatin and Chantix.



FAMILY HISTORY:  Significant for diabetes and ovarian cancer with her mother and

organic heart disease with her grandmother and colon polyps with her

grandfather.



REVIEW OF SYSTEMS:

HEENT:  There is no decrease in visual acuity issues.

CARDIAC:  No history of hypertension, palpitations, syncope.

PULMONARY:  No history of tobaccoism.

NEUROLOGIC:  No stroke or migraine.

PSYCHIATRIC:  No mood swings, depression, insomnia.

HEMATOLOGIC:  No bleeding, bruising, or coagulopathy.

GASTROINTESTINAL:  See history of present illness.

MUSCULOSKELETAL:  History of osteoarthrosis.

ENDOCRINE:  There is no history of diabetes.  There is history of

hyperlipidemia.

RENAL:  No dysuria, frequency, hematuria.

DERMATOLOGIC:  No skin rashes or pruritus.



PHYSICAL EXAMINATION:

GENERAL:  A well-nourished, well-developed  female who is alert and

cooperative, in no acute distress.

VITAL SIGNS:  Temperature 98, pulse 93, and respirations 18.

HEENT:  Normocephalic, atraumatic.  Pupils and extraocular muscles are not

tested.  Sclerae anicteric.

NECK:  Supple.

LUNGS:  Clear.

CARDIOVASCULAR:  Reveals an S1, S2 without S3, S4 or appreciable murmur.

ABDOMEN:  Reveals soft abdomen, normal bowel sounds without appreciable

hepatosplenomegaly with _____  incision.

EXTREMITIES:  Reveals no cyanosis, clubbing, edema.



IMPRESSION:  Colorectal screening is warranted at this time.  Risks and benefits

of procedure including risk of hemorrhage and perforation requiring operation

have been discussed.  The patient is willing to proceed.



I would like to thank Dr. Chavez, for allowing me to consult and participate in

this patient's care.

 



______________________________

ROSY MORGAN MD



DR:  SSP/becky  JOB#:  [id number] / [id number]

DD:  2019 07:56  DT:  2019 09:07



MARTELL Everett MD

## 2019-06-24 NOTE — PATHOLOGY
[facility] Accession Number: [id number]

.                                                                01

Material submitted:                                        .

rectum - RECTAL COLON POLYP BIOPSIES

.                                                                01

Clinical history:                                          .

Screening colon.

.                                                                02

**********************************************************************

Diagnosis:

Colorectal biopsies, rectal polyps:

- Hyperplastic polyps.

(JPM:leila; 06/24/2019)

QMS/06/24/2019

**********************************************************************

.                                                                02

Comment:

There are no adenomatous changes or evidence of malignancy.

(JPM:leila; 06/24/2019)

.                                                                02

Electronically signed:                                     .

Ron Zhang MD, Pathologist

NPI- [id number]

.                                                                01

Gross description:                                         .

Received in formalin labeled "[place], Lizeth, rectal colon polyp

biopsies" is a 0.6 x 0.3 x 0.2 cm aggregate of tan-brown mucosa fragments.

The specimen is submitted in A1. ([facility]; 6/23/2019)

SYC/SYC

.                                                                02

Pathologist provided ICD-10:

K62.1, Z12.11

.                                                                02

CPT                                                        .

832221

Specimen Comment: A courtesy copy of this report has been sent to

Specimen Comment: [phone], [phone].

Specimen Comment: Report sent to  / DR BROWNING

***Performed at:  01

   [facility]

   [address], [place], [place]  [id number]

   MD Sarmad Schwab MD Phone:  [phone]

***Performed at:  02

   [facility]

   [address] [place], [place]  [id number]

   MD Ron Zhang MD Phone:  [phone]

## 2019-09-23 VITALS
SYSTOLIC BLOOD PRESSURE: 141 MMHG | DIASTOLIC BLOOD PRESSURE: 74 MMHG | DIASTOLIC BLOOD PRESSURE: 74 MMHG | DIASTOLIC BLOOD PRESSURE: 74 MMHG | DIASTOLIC BLOOD PRESSURE: 74 MMHG | SYSTOLIC BLOOD PRESSURE: 141 MMHG | SYSTOLIC BLOOD PRESSURE: 141 MMHG | SYSTOLIC BLOOD PRESSURE: 141 MMHG | DIASTOLIC BLOOD PRESSURE: 74 MMHG | SYSTOLIC BLOOD PRESSURE: 141 MMHG

## 2019-10-10 ENCOUNTER — HOSPITAL ENCOUNTER (OUTPATIENT)
Dept: HOSPITAL 61 - MAMMO | Age: 64
Discharge: HOME | End: 2019-10-10
Attending: FAMILY MEDICINE
Payer: COMMERCIAL

## 2019-10-10 DIAGNOSIS — R92.0: Primary | ICD-10-CM

## 2019-10-10 PROCEDURE — 77065 DX MAMMO INCL CAD UNI: CPT

## 2019-10-10 NOTE — RAD
DATE: 10/10/2019 8:51 AM



EXAM: MAMMO ZAHRA DIAG RT



HISTORY:  further evaluation of a finding noted on her most recent screening

mammographic examination. On that examination indeterminate

microcalcifications were reported within the right 



COMPARISON: 4/2/2019



Right CC and MLO views of the breasts were performed. Right breast

tomosynthesis was performed in CC and MLO projections.



This study was interpreted with the benefit of Computerized Aided Detection

(CAD).





FINDINGS:



Breast Density: SCATTERED  The breast parenchyma shows scattered

fibroglandular densities. Breast parenchyma level B



Benign calcifications are present. The parenchymal pattern appears stable. The

right breast microcalcifications are stable in appearance.



No suspicious masses, microcalcifications or architectural distortion is

present to suggest malignancy in the left breast.



The visualized axillae are unremarkable. 



IMPRESSION: Stable appearance of the right breast macrocalcifications. 



BI-RADS CATEGORY: 3 PROBABLY BENIGN FINDING(S)-SHORT INTERVAL FOLLOW-UP

SUGGESTED



RECOMMENDED FOLLOW-UP: 6M 6 MONTH FOLLOW-UP 6 month follow-up of the right

breast microcalcifications, at which point the patient will be due for

contralateral screening.



PQRS compliance statement: Patient information was entered into a reminder

system with a target due date for the next mammogram.



Mammography is a sensitive method for finding small breast cancers, but it

does not detect them all and is not a substitute for careful clinical

examination.  A negative mammogram does not negate a clinically suspicious

finding and should not result in delay in biopsying a clinically suspicious

abnormality.



"Our facility is accredited by the American College of [employer] Mammography

Program."

## 2020-03-06 ENCOUNTER — HOSPITAL ENCOUNTER (OUTPATIENT)
Dept: HOSPITAL 61 - LAB | Age: 65
End: 2020-03-06
Attending: FAMILY MEDICINE
Payer: COMMERCIAL

## 2020-03-06 DIAGNOSIS — I77.6: Primary | ICD-10-CM

## 2020-03-06 PROCEDURE — 88305 TISSUE EXAM BY PATHOLOGIST: CPT

## 2020-03-06 PROCEDURE — 88312 SPECIAL STAINS GROUP 1: CPT

## 2020-07-22 ENCOUNTER — HOSPITAL ENCOUNTER (OUTPATIENT)
Dept: HOSPITAL 61 - LAB | Age: 65
Discharge: HOME | End: 2020-07-22
Attending: INTERNAL MEDICINE
Payer: COMMERCIAL

## 2020-07-22 DIAGNOSIS — Z20.828: Primary | ICD-10-CM

## 2020-07-30 ENCOUNTER — HOSPITAL ENCOUNTER (OUTPATIENT)
Dept: HOSPITAL 61 - MAMMO | Age: 65
Discharge: HOME | End: 2020-07-30
Attending: FAMILY MEDICINE
Payer: COMMERCIAL

## 2020-07-30 DIAGNOSIS — R92.1: Primary | ICD-10-CM

## 2020-07-30 PROCEDURE — 77066 DX MAMMO INCL CAD BI: CPT

## 2020-07-30 NOTE — RAD
DATE: 7/30/2020 2:48 PM



EXAM: MAMMO ZAHRA [place]



HISTORY:  Patient is due for bilateral screening mammogram



Presents for six-month follow-up probably benign right breast lower inner

calcifications.



COMPARISON: 2/14/2018, 2/21/2018, 9/11/2018, 4/2/2019, 10/10/2019 mammograms

(most recent exam was of the right breast only).



Bilateral CC and MLO views of the breasts were performed. Bilateral breast

tomosynthesis was performed in CC and MLO projections. Magnification right cc

view also obtained.



This study was interpreted with the benefit of Computerized Aided Detection

(CAD).





FINDINGS:



Breast Density: FATTY  The Breast Parenchyma is primarily fatty replaced.

Breast parenchyma level density A.



Loose cluster of punctate calcifications in the medial inferior right breast

at the approximate 4:00 position 12 cm from the nipple shows no significant

change in over 2 years and is compatible with a benign etiology. No developing

mass, architectural distortion or developing calcifications appreciated. 



Left mammogram is negative. 



 

IMPRESSION: Benign right breast calcifications. No evidence of malignancy in

either breast. 



BI-RADS CATEGORY: 2 BENIGN FINDING(S)



RECOMMENDED FOLLOW-UP: 12M 12 MONTH FOLLOW-UP Annual screening mammography is

recommended, unless clinically indicated sooner based on symptoms or change in

physical exam. Discussed with patient.



PQRS compliance statement: Patient information was entered into a reminder

system with a target due date for the next mammogram.



Mammography is a sensitive method for finding small breast cancers, but it

does not detect them all and is not a substitute for careful clinical

examination.  A negative mammogram does not negate a clinically suspicious

finding and should not result in delay in biopsying a clinically suspicious

abnormality.



"Our facility is accredited by the American College of [employer] Mammography

Program."

## 2020-12-01 ENCOUNTER — HOSPITAL ENCOUNTER (OUTPATIENT)
Dept: HOSPITAL 61 - LAB | Age: 65
End: 2020-12-01
Attending: INTERNAL MEDICINE
Payer: COMMERCIAL

## 2020-12-01 DIAGNOSIS — U07.1: Primary | ICD-10-CM

## 2020-12-01 PROCEDURE — U0003 INFECTIOUS AGENT DETECTION BY NUCLEIC ACID (DNA OR RNA); SEVERE ACUTE RESPIRATORY SYNDROME CORONAVIRUS 2 (SARS-COV-2) (CORONAVIRUS DISEASE [COVID-19]), AMPLIFIED PROBE TECHNIQUE, MAKING USE OF HIGH THROUGHPUT TECHNOLOGIES AS DESCRIBED BY CMS-2020-01-R: HCPCS

## 2021-01-08 ENCOUNTER — HOSPITAL ENCOUNTER (INPATIENT)
Dept: HOSPITAL 61 - ER | Age: 66
LOS: 1 days | Discharge: HOME | DRG: 512 | End: 2021-01-09
Attending: FAMILY MEDICINE | Admitting: FAMILY MEDICINE
Payer: COMMERCIAL

## 2021-01-08 VITALS — BODY MASS INDEX: 35.01 KG/M2 | HEIGHT: 66 IN | WEIGHT: 217.82 LBS

## 2021-01-08 VITALS — DIASTOLIC BLOOD PRESSURE: 85 MMHG | SYSTOLIC BLOOD PRESSURE: 149 MMHG

## 2021-01-08 VITALS — SYSTOLIC BLOOD PRESSURE: 124 MMHG | DIASTOLIC BLOOD PRESSURE: 74 MMHG

## 2021-01-08 VITALS — SYSTOLIC BLOOD PRESSURE: 140 MMHG | DIASTOLIC BLOOD PRESSURE: 72 MMHG
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
## 2021-01-08 DIAGNOSIS — Z20.822: ICD-10-CM

## 2021-01-08 DIAGNOSIS — E78.5: ICD-10-CM
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
## 2021-01-08 DIAGNOSIS — J44.9: ICD-10-CM

## 2021-01-08 DIAGNOSIS — S52.032A: Primary | ICD-10-CM

## 2021-01-08 LAB
ALBUMIN SERPL-MCNC: 3.6 G/DL (ref 3.4–5)
ALBUMIN/GLOB SERPL: 0.8 {RATIO} (ref 1–1.7)
ALP SERPL-CCNC: 90 U/L (ref 46–116)
ALT SERPL-CCNC: 106 U/L (ref 14–59)
ANION GAP SERPL CALC-SCNC: 10 MMOL/L (ref 6–14)
AST SERPL-CCNC: 58 U/L (ref 15–37)
BASOPHILS # BLD AUTO: 0.1 X10^3/UL (ref 0–0.2)
BASOPHILS NFR BLD: 1 % (ref 0–3)
BILIRUB SERPL-MCNC: 0.4 MG/DL (ref 0.2–1)
BUN SERPL-MCNC: 18 MG/DL (ref 7–20)
BUN/CREAT SERPL: 30 (ref 6–20)
CALCIUM SERPL-MCNC: 9 MG/DL (ref 8.5–10.1)
CHLORIDE SERPL-SCNC: 102 MMOL/L (ref 98–107)
CO2 SERPL-SCNC: 24 MMOL/L (ref 21–32)
CREAT SERPL-MCNC: 0.6 MG/DL (ref 0.6–1)
EOSINOPHIL NFR BLD: 0.1 X10^3/UL (ref 0–0.7)
EOSINOPHIL NFR BLD: 1 % (ref 0–3)
ERYTHROCYTE [DISTWIDTH] IN BLOOD BY AUTOMATED COUNT: 13.8 % (ref 11.5–14.5)
GFR SERPLBLD BASED ON 1.73 SQ M-ARVRAT: 100.6 ML/MIN
GLUCOSE SERPL-MCNC: 140 MG/DL (ref 70–99)
HCT VFR BLD CALC: 39.4 % (ref 36–47)
HGB BLD-MCNC: 12.9 G/DL (ref 12–15.5)
LYMPHOCYTES # BLD: 2.6 X10^3/UL (ref 1–4.8)
LYMPHOCYTES NFR BLD AUTO: 20 % (ref 24–48)
MCH RBC QN AUTO: 28 PG (ref 25–35)
MCHC RBC AUTO-ENTMCNC: 33 G/DL (ref 31–37)
MCV RBC AUTO: 86 FL (ref 79–100)
MONO #: 1.2 X10^3/UL (ref 0–1.1)
MONOCYTES NFR BLD: 9 % (ref 0–9)
NEUT #: 9.2 X10^3/UL (ref 1.8–7.7)
NEUTROPHILS NFR BLD AUTO: 70 % (ref 31–73)
PLATELET # BLD AUTO: 381 X10^3/UL (ref 140–400)
POTASSIUM SERPL-SCNC: 4.1 MMOL/L (ref 3.5–5.1)
PROT SERPL-MCNC: 8.3 G/DL (ref 6.4–8.2)
RBC # BLD AUTO: 4.61 X10^6/UL (ref 3.5–5.4)
SODIUM SERPL-SCNC: 136 MMOL/L (ref 136–145)
WBC # BLD AUTO: 13.2 X10^3/UL (ref 4–11)

## 2021-01-08 PROCEDURE — 80053 COMPREHEN METABOLIC PANEL: CPT

## 2021-01-08 PROCEDURE — 80048 BASIC METABOLIC PNL TOTAL CA: CPT

## 2021-01-08 PROCEDURE — A4565 SLINGS: HCPCS

## 2021-01-08 PROCEDURE — 73080 X-RAY EXAM OF ELBOW: CPT

## 2021-01-08 PROCEDURE — 86850 RBC ANTIBODY SCREEN: CPT

## 2021-01-08 PROCEDURE — 73564 X-RAY EXAM KNEE 4 OR MORE: CPT

## 2021-01-08 PROCEDURE — G0378 HOSPITAL OBSERVATION PER HR: HCPCS

## 2021-01-08 PROCEDURE — 86900 BLOOD TYPING SEROLOGIC ABO: CPT

## 2021-01-08 PROCEDURE — 96374 THER/PROPH/DIAG INJ IV PUSH: CPT

## 2021-01-08 PROCEDURE — 82306 VITAMIN D 25 HYDROXY: CPT

## 2021-01-08 PROCEDURE — 86901 BLOOD TYPING SEROLOGIC RH(D): CPT

## 2021-01-08 PROCEDURE — U0003 INFECTIOUS AGENT DETECTION BY NUCLEIC ACID (DNA OR RNA); SEVERE ACUTE RESPIRATORY SYNDROME CORONAVIRUS 2 (SARS-COV-2) (CORONAVIRUS DISEASE [COVID-19]), AMPLIFIED PROBE TECHNIQUE, MAKING USE OF HIGH THROUGHPUT TECHNOLOGIES AS DESCRIBED BY CMS-2020-01-R: HCPCS

## 2021-01-08 PROCEDURE — 73030 X-RAY EXAM OF SHOULDER: CPT

## 2021-01-08 PROCEDURE — 85025 COMPLETE CBC W/AUTO DIFF WBC: CPT

## 2021-01-08 PROCEDURE — C1713 ANCHOR/SCREW BN/BN,TIS/BN: HCPCS

## 2021-01-08 PROCEDURE — 96372 THER/PROPH/DIAG INJ SC/IM: CPT

## 2021-01-08 PROCEDURE — 99285 EMERGENCY DEPT VISIT HI MDM: CPT

## 2021-01-08 PROCEDURE — 85007 BL SMEAR W/DIFF WBC COUNT: CPT

## 2021-01-08 PROCEDURE — 36415 COLL VENOUS BLD VENIPUNCTURE: CPT

## 2021-01-08 PROCEDURE — 87426 SARSCOV CORONAVIRUS AG IA: CPT

## 2021-01-08 RX ADMIN — HYDROMORPHONE HYDROCHLORIDE PRN MG: 2 INJECTION INTRAMUSCULAR; INTRAVENOUS; SUBCUTANEOUS at 15:59

## 2021-01-08 RX ADMIN — HYDROMORPHONE HYDROCHLORIDE PRN MG: 2 INJECTION INTRAMUSCULAR; INTRAVENOUS; SUBCUTANEOUS at 19:27

## 2021-01-08 NOTE — PHYS DOC
Past Medical History


Past Medical History


hld


Past Surgical History:  , Other


Additional Past Surgical Histo:  C SECT


Smoking Status:  Former Smoker


Alcohol Use:  Rarely


Drug Use:  None





General Adult


EDM:


Chief Complaint:  MECHANICAL FALL





HPI:


HPI:


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 

fell.  She injured her left elbow and left knee.  She has a sharp shooting pain 

in those areas that is nonradiating without alleviating factors.  She denies 

head injury chest pain shortness of breath or abdominal pain.  She denies any 

other injuries.  The pain is moderate to severe intermittent and worse with 

movement of the elbow.





Review of systems negative for head injury loss of consciousness neck pain chest

pain shortness of breath abdominal pain or back pain.  All other review of 

systems negative.





ED course: 65-year-old female with elbow and shoulder pain on the left and left 

knee pain after a fall up in the operating room.  X-rays obtained.  X-ray shows 

olecranon FRACTURE.  I spoke with Dr. Quinonez who will surgically fix the 

patient's fracture.  We placed the patient in a posterior long splint and a 

sling.  Will admit the patient to the hospitalist for surgical repair of the 

olecranon fracture.





Heart Score:


Risk Factors:


Risk Factors:  DM, Current or recent (<one month) smoker, HTN, HLP, family 

history of CAD, obesity.


Risk Scores:


Score 0 - 3:  2.5% MACE over next 6 weeks - Discharge Home


Score 4 - 6:  20.3% MACE over next 6 weeks - Admit for Clinical Observation


Score 7 - 10:  72.7% MACE over next 6 weeks - Early Invasive Strategies





Current Medications:





Current Medications








 Medications


  (Trade)  Dose


 Ordered  Sig/Soham  Start Time


 Stop Time Status Last Admin


Dose Admin


 


 Lorazepam


  (Ativan)  1 mg  STK-MED ONCE  21 09:27


 21 09:27 DC  














Allergies:


Allergies:





Allergies








Coded Allergies Type Severity Reaction Last Updated Verified


 


  No Known Drug Allergies    19 No











Physical Exam:


PE:





General Appearance   alert, cooperative, no distress, responsive


Head   Normocephalic, without obvious abnormality, atraumatic


Eyes   conjunctivae/corneas clear. PERRL, EOM's intact. 


Nose   Nares normal. Septum midline. Mucosa normal. No drainage or sinus 

tenderness.


Throat   no blood or lacerations, normal alignment


Neck   supple, symmetrical, trachea midline


Back/Spine   symmetric, normal curvature. ROM normal, no abrasions, no tenderne

ss to palpation, no step-offs


Lungs   clear to auscultation bilaterally


Chest Wall   normal ribcage without tenderness to palpation, crepitus or 

emphysema


Heart   reg rate and regular rhythm, S1, S2 normal, no murmur, click, rub or 

gallop


Abdomen   soft, non-tender. Bowel sounds normal. No masses,  no organomegaly.  

No rebound tenderness or guarding.


Pelvic   stable


Extremities   





The patient's left upper extremity has a nontender clavicle.  Some pain with 

passive range of motion of the left shoulder.  No deformity of the shoulder.  No

abrasions lacerations or ecchymosis of the left upper extremity.  She has 

tenderness to palpation of the lateral portion of the elbow with pain with 

passive range of motion.  Nontender wrist with normal range of motion.  Palpable

pulse with 2 sec cap refill.  Normal motor and sensory function of the hand.





The left lower extremity has tenderness over the patella.  Nontender in the knee

otherwise.  Normal range of motion of the knee without any pain with passive 

range of motion of the knee.  The hip and ankle are nontender with normal range 

of motion.  Palpable pulse with 2-second cap refill distally.





The remainder the extremities are nontender with normal range of motion of the 

joints and neurovascularly intact.





Pulses   2+ and symmetric


Skin   Skin color, texture, turgor normal. No rashes or lesions


Neurologic   Grossly normal 


Eye opening: (4) spontaneous


Best motor response: (6) obeys verbal command


Best verbal response: (5) oriented and converses


 Total Lee Ann (E + M + V) = 15





Current Patient Data:


Vital Signs:





                                   Vital Signs








  Date Time  Temp Pulse Resp B/P (MAP) Pulse Ox O2 Delivery O2 Flow Rate FiO2


 


21 09:17 98.8   99/56 (70)    





 98.8       











EKG:


EKG:


[]





Radiology/Procedures:


Radiology/Procedures:


[]





Course & Med Decision Making:


Course & Med Decision Making


Pertinent Labs and Imaging studies reviewed. (See chart for details)





[]





Dragon Disclaimer:


Dragon Disclaimer:


This electronic medical record was generated, in whole or in part, using a voice

 recognition dictation system.





Departure


Departure


Impression:  


   Primary Impression:  


   Elbow fracture, left


Disposition:   ADMITTED AS INPT THIS HOSP


Admitting Physician:  HIMS


Condition:  STABLE


Referrals:  


MARTELL BRONWING MD (PCP)











CARMELA WAN MD                2021 10:28

## 2021-01-08 NOTE — RAD
EXAM: Left knee, 4 views; left shoulder, 3 views; left elbow, 3 views.



HISTORY: Fall. Pain.



COMPARISON: None.



FINDINGS: 



Left knee: 4 views of the left knee are obtained. There is medial compartment joint space narrowing a
nd spurring. There is enthesopathy along the superior patella. There is no joint effusion. There is n
o fracture, dislocation or subluxation.



Left shoulder: 3 views of the left shoulder obtained. There is no acute fracture, dislocation or subl
uxation. There is mild glenohumeral spurring.



Left elbow: 3 views of the left elbow are obtained. The exam is limited due to oblique projections. T
here is a displaced olecranon fracture with overlying soft tissue swelling. No distal humeral fractur
e is seen. The radial head is obscured due to overlying osseous structures. There is a suspected elbo
w effusion.



IMPRESSION: 

1. Left olecranon fracture with overlying soft tissue swelling. Evaluation for a proximal radial frac
ture is limited due to patient positioning. There is no true lateral view. Repeat examination can be 
attempted when clinically feasible.

2. Mild medial compartment os arthritis of the left knee and mild left glenohumeral osteoarthritis.



Electronically signed by: Isidra Ibrahim MD (1/8/2021 11:09 AM) [id number]

## 2021-01-08 NOTE — RAD
EXAM: Left knee, 4 views; left shoulder, 3 views; left elbow, 3 views.



HISTORY: Fall. Pain.



COMPARISON: None.



FINDINGS: 



Left knee: 4 views of the left knee are obtained. There is medial compartment joint space narrowing a
nd spurring. There is enthesopathy along the superior patella. There is no joint effusion. There is n
o fracture, dislocation or subluxation.



Left shoulder: 3 views of the left shoulder obtained. There is no acute fracture, dislocation or subl
uxation. There is mild glenohumeral spurring.



Left elbow: 3 views of the left elbow are obtained. The exam is limited due to oblique projections. T
here is a displaced olecranon fracture with overlying soft tissue swelling. No distal humeral fractur
e is seen. The radial head is obscured due to overlying osseous structures. There is a suspected elbo
w effusion.



IMPRESSION: 

1. Left olecranon fracture with overlying soft tissue swelling. Evaluation for a proximal radial frac
ture is limited due to patient positioning. There is no true lateral view. Repeat examination can be 
attempted when clinically feasible.

2. Mild medial compartment os arthritis of the left knee and mild left glenohumeral osteoarthritis.



Electronically signed by: Isidra Ibrahim MD (1/8/2021 11:09 AM) [id number]

## 2021-01-08 NOTE — PDOC2
CONSULT


Date of Consult


Date of Consult


DATE: 21 


TIME: 14:32





Reason for Consult


Reason for Consult:


Left elbow olecranon fracture





Identification/Chief Complaint


Chief Complaint


Left elbow pain after a fall





Source


Source:  Chart review, Patient





History of Present Illness


Reason for Visit:


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  She injured her left elbow and left 

knee.  She has a sharp shooting pain in those areas that is nonradiating without

alleviating factors.  She denies head injury chest pain shortness of breath or 

abdominal pain.  She denies any other injuries.  The pain is moderate to severe 

intermittent and worse with movement of the elbow.  The skin is reported is 

intact over the elbow fracture.  Knee x-rays and shoulder x-rays showed no 

fracture.  There is an olecranon fracture which is displaced.  I was consulted.





Past Medical History


Cardiovascular:  Hyperlipidemia


Pulmonary:  COPD





Past Surgical History


Past Surgical History:  





Family History


Family History:  Heart Disease





Social History


Social History


quit smoking about 1.5 years ago. occasional EtOH


Quit


ALCOHOL:  rare


Lives:  with Family





Current Medications


Current Medications





Current Medications


Lorazepam (Ativan) 1 mg STK-MED ONCE .ROUTE ;  Start 21 at 09:27;  Stop 

21 at 09:27;  Status DC


Hydromorphone HCl (Dilaudid) 0.5 mg 1X  ONCE IM  Last administered on 21at 

11:12;  Start 21 at 10:30;  Stop 21 at 10:31;  Status DC


Sodium Chloride 1,000 ml @  100 mls/hr Q10H IV ;  Start 21 at 12:30;  Stop 

21 at 16:29


Hydromorphone HCl (Dilaudid) 0.5 mg PRN Q30MIN  PRN IV SEVERE PAIN 7-10 Last 

administered on 21at 12:54;  Start 21 at 12:45





Active Scripts


Active


Reported


Atorvastatin Calcium 40 Mg Tablet 1 Tab PO DAILY





Allergies


Allergies:  


Coded Allergies:  


     No Known Drug Allergies (Unverified , 19)





Physical Exam


General:  Alert, Cooperative


HEENT:  Atraumatic


Lungs:  Normal air movement


Heart:  Regular rate


Abdomen:  Soft


Extremities:  Other (The left elbow was in a splint.  Neurovascular function is 

intact at the hand.  The skin is reported is intact.)


Skin:  No breakdown, No significant lesion


Neuro:  Normal speech, Sensation intact


Psych/Mental Status:  Mental status NL, Mood NL





Vitals


VITALS





Vital Signs








  Date Time  Temp Pulse Resp B/P (MAP) Pulse Ox O2 Delivery O2 Flow Rate FiO2


 


21 14:19      Room Air  


 


21 12:54   16     


 


21 11:12     99   


 


21 09:17 98.8   99/56 (70)    





 98.8       











Labs


Labs





Laboratory Tests








Test


 21


12:10 21


12:15


 


SARS-CoV-2 Antigen (Rapid)


 Negative


(NEGATIVE) 





 


White Blood Count


 


 13.2 x10^3/uL


(4.0-11.0)


 


Red Blood Count


 


 4.61 x10^6/uL


(3.50-5.40)


 


Hemoglobin


 


 12.9 g/dL


(12.0-15.5)


 


Hematocrit


 


 39.4 %


(36.0-47.0)


 


Mean Corpuscular Volume  86 fL () 


 


Mean Corpuscular Hemoglobin  28 pg (25-35) 


 


Mean Corpuscular Hemoglobin


Concent 


 33 g/dL


(31-37)


 


Red Cell Distribution Width


 


 13.8 %


(11.5-14.5)


 


Platelet Count


 


 381 x10^3/uL


(140-400)


 


Neutrophils (%) (Auto)  70 % (31-73) 


 


Lymphocytes (%) (Auto)  20 % (24-48) 


 


Monocytes (%) (Auto)  9 % (0-9) 


 


Eosinophils (%) (Auto)  1 % (0-3) 


 


Basophils (%) (Auto)  1 % (0-3) 


 


Neutrophils # (Auto)


 


 9.2 x10^3/uL


(1.8-7.7)


 


Lymphocytes # (Auto)


 


 2.6 x10^3/uL


(1.0-4.8)


 


Monocytes # (Auto)


 


 1.2 x10^3/uL


(0.0-1.1)


 


Eosinophils # (Auto)


 


 0.1 x10^3/uL


(0.0-0.7)


 


Basophils # (Auto)


 


 0.1 x10^3/uL


(0.0-0.2)


 


Sodium Level


 


 136 mmol/L


(136-145)


 


Potassium Level


 


 4.1 mmol/L


(3.5-5.1)


 


Chloride Level


 


 102 mmol/L


()


 


Carbon Dioxide Level


 


 24 mmol/L


(21-32)


 


Anion Gap  10 (6-14) 


 


Blood Urea Nitrogen


 


 18 mg/dL


(7-20)


 


Creatinine


 


 0.6 mg/dL


(0.6-1.0)


 


Estimated GFR


(Cockcroft-Gault) 


 100.6 





 


BUN/Creatinine Ratio  30 (6-20) 


 


Glucose Level


 


 140 mg/dL


(70-99)


 


Calcium Level


 


 9.0 mg/dL


(8.5-10.1)


 


Total Bilirubin


 


 0.4 mg/dL


(0.2-1.0)


 


Aspartate Amino Transf


(AST/SGOT) 


 58 U/L (15-37) 





 


Alanine Aminotransferase


(ALT/SGPT) 


 106 U/L


(14-59)


 


Alkaline Phosphatase


 


 90 U/L


()


 


Total Protein


 


 8.3 g/dL


(6.4-8.2)


 


Albumin


 


 3.6 g/dL


(3.4-5.0)


 


Albumin/Globulin Ratio  0.8 (1.0-1.7) 








Laboratory Tests








Test


 21


12:10 21


12:15


 


SARS-CoV-2 Antigen (Rapid)


 Negative


(NEGATIVE) 





 


White Blood Count


 


 13.2 x10^3/uL


(4.0-11.0)


 


Red Blood Count


 


 4.61 x10^6/uL


(3.50-5.40)


 


Hemoglobin


 


 12.9 g/dL


(12.0-15.5)


 


Hematocrit


 


 39.4 %


(36.0-47.0)


 


Mean Corpuscular Volume  86 fL () 


 


Mean Corpuscular Hemoglobin  28 pg (25-35) 


 


Mean Corpuscular Hemoglobin


Concent 


 33 g/dL


(31-37)


 


Red Cell Distribution Width


 


 13.8 %


(11.5-14.5)


 


Platelet Count


 


 381 x10^3/uL


(140-400)


 


Neutrophils (%) (Auto)  70 % (31-73) 


 


Lymphocytes (%) (Auto)  20 % (24-48) 


 


Monocytes (%) (Auto)  9 % (0-9) 


 


Eosinophils (%) (Auto)  1 % (0-3) 


 


Basophils (%) (Auto)  1 % (0-3) 


 


Neutrophils # (Auto)


 


 9.2 x10^3/uL


(1.8-7.7)


 


Lymphocytes # (Auto)


 


 2.6 x10^3/uL


(1.0-4.8)


 


Monocytes # (Auto)


 


 1.2 x10^3/uL


(0.0-1.1)


 


Eosinophils # (Auto)


 


 0.1 x10^3/uL


(0.0-0.7)


 


Basophils # (Auto)


 


 0.1 x10^3/uL


(0.0-0.2)


 


Sodium Level


 


 136 mmol/L


(136-145)


 


Potassium Level


 


 4.1 mmol/L


(3.5-5.1)


 


Chloride Level


 


 102 mmol/L


()


 


Carbon Dioxide Level


 


 24 mmol/L


(21-32)


 


Anion Gap  10 (6-14) 


 


Blood Urea Nitrogen


 


 18 mg/dL


(7-20)


 


Creatinine


 


 0.6 mg/dL


(0.6-1.0)


 


Estimated GFR


(Cockcroft-Gault) 


 100.6 





 


BUN/Creatinine Ratio  30 (6-20) 


 


Glucose Level


 


 140 mg/dL


(70-99)


 


Calcium Level


 


 9.0 mg/dL


(8.5-10.1)


 


Total Bilirubin


 


 0.4 mg/dL


(0.2-1.0)


 


Aspartate Amino Transf


(AST/SGOT) 


 58 U/L (15-37) 





 


Alanine Aminotransferase


(ALT/SGPT) 


 106 U/L


(14-59)


 


Alkaline Phosphatase


 


 90 U/L


()


 


Total Protein


 


 8.3 g/dL


(6.4-8.2)


 


Albumin


 


 3.6 g/dL


(3.4-5.0)


 


Albumin/Globulin Ratio  0.8 (1.0-1.7) 











Images


Images


Reports reviewed and images independently reviewed left shoulder, left elbow and

left knee.  No acute fracture of the shoulder or the knee.  Minimal degenerative

changes in those.  The left elbow shows a displaced olecranon fracture.


[facility]  


                              [address]  [place], [place] [id number] [phone]  


 


                                           IMAGING REPORT  


 


                                               Signed  


 


PATIENT: YESI MANJARREZ        ACCOUNT: [id number]            MRN#: 

[id number]  


: 1955                  LOCATION: ER                     AGE: 65  


SEX: F                           EXAM DT: 21                ACCESSION#: 

[id number]  


STATUS: REG ER                   ORD. PHYSICIAN: CARMELA WAN MD   


REASON: left elbow pain  


PROCEDURE: SHOULDER 2+V LEFT  


 


EXAM: Left knee, 4 views; left shoulder, 3 views; left elbow, 3 views.  


 


 HISTORY: Fall. Pain.  


 


 COMPARISON: None.  


 


 FINDINGS:   


 


 Left knee: 4 views of the left knee are obtained. There is medial compartment 

joint space narrowing


and spurring. There is enthesopathy along the superior patella. There is no 

joint effusion. There is


no fracture, dislocation or subluxation.  


 


 Left shoulder: 3 views of the left shoulder obtained. There is no acute 

fracture, dislocation or 


subluxation. There is mild glenohumeral spurring.  


 


 Left elbow: 3 views of the left elbow are obtained. The exam is limited due to 

oblique projections.


There is a displaced olecranon fracture with overlying soft tissue swelling. No 

distal humeral 


fracture is seen. The radial head is obscured due to overlying osseous 

structures. There is a 


suspected elbow effusion.  


 


 IMPRESSION:   


 1. Left olecranon fracture with overlying soft tissue swelling. Evaluation for 

a proximal radial 


fracture is limited due to patient positioning. There is no true lateral view. 

Repeat examination 


can be attempted when clinically feasible.  


 2. Mild medial compartment os arthritis of the left knee and mild left 

glenohumeral osteoarthritis.  


 


 Electronically signed by: Isidra Miles MD (2021 11:09 AM) [id number]  


 


 


 


 


DICTATED and SIGNED BY:     ISIDRA MILES MD  


DATE:     21 1106





Assessment/Plan


Assessment/Plan


Displaced fracture of olecranon process with intraarticular extension of left 

ulna ICD-10-CM Code S52.032 





The fracture is displaced and involves the joint.  I recommended open treatment 

with internal fixation.  Nonoperative treatment for this fracture is not 

recommended.  I recommended plate and screw fixation.  We discussed the 

potential risks of infection neurovascular injury bleeding stiffness need for 

hardware removal nonunion or other potential surgical or anesthetic 

complications.  All of her questions about surgery were answered and she desires

to proceed.





Surgical plan is: Open treatment of olecranon process with internal fixation CPT

19249





Nothing to eat or drink after midnight.  Surgery will be first thing in the 

orning.











NIGN SUNSHINE MD                  2021 14:34

## 2021-01-08 NOTE — RAD
EXAM: Left knee, 4 views; left shoulder, 3 views; left elbow, 3 views.



HISTORY: Fall. Pain.



COMPARISON: None.



FINDINGS: 



Left knee: 4 views of the left knee are obtained. There is medial compartment joint space narrowing a
nd spurring. There is enthesopathy along the superior patella. There is no joint effusion. There is n
o fracture, dislocation or subluxation.



Left shoulder: 3 views of the left shoulder obtained. There is no acute fracture, dislocation or subl
uxation. There is mild glenohumeral spurring.



Left elbow: 3 views of the left elbow are obtained. The exam is limited due to oblique projections. T
here is a displaced olecranon fracture with overlying soft tissue swelling. No distal humeral fractur
e is seen. The radial head is obscured due to overlying osseous structures. There is a suspected elbo
w effusion.



IMPRESSION: 

1. Left olecranon fracture with overlying soft tissue swelling. Evaluation for a proximal radial frac
ture is limited due to patient positioning. There is no true lateral view. Repeat examination can be 
attempted when clinically feasible.

2. Mild medial compartment os arthritis of the left knee and mild left glenohumeral osteoarthritis.



Electronically signed by: Isidra Ibrahim MD (1/8/2021 11:09 AM) [id number]

## 2021-01-09 VITALS
DIASTOLIC BLOOD PRESSURE: 78 MMHG | DIASTOLIC BLOOD PRESSURE: 78 MMHG | SYSTOLIC BLOOD PRESSURE: 129 MMHG | SYSTOLIC BLOOD PRESSURE: 129 MMHG | DIASTOLIC BLOOD PRESSURE: 78 MMHG | SYSTOLIC BLOOD PRESSURE: 129 MMHG | DIASTOLIC BLOOD PRESSURE: 78 MMHG | SYSTOLIC BLOOD PRESSURE: 129 MMHG | DIASTOLIC BLOOD PRESSURE: 78 MMHG | DIASTOLIC BLOOD PRESSURE: 78 MMHG | DIASTOLIC BLOOD PRESSURE: 78 MMHG | SYSTOLIC BLOOD PRESSURE: 129 MMHG | SYSTOLIC BLOOD PRESSURE: 129 MMHG | SYSTOLIC BLOOD PRESSURE: 129 MMHG

## 2021-01-09 VITALS — DIASTOLIC BLOOD PRESSURE: 71 MMHG | SYSTOLIC BLOOD PRESSURE: 142 MMHG

## 2021-01-09 VITALS — SYSTOLIC BLOOD PRESSURE: 150 MMHG | DIASTOLIC BLOOD PRESSURE: 84 MMHG

## 2021-01-09 VITALS — DIASTOLIC BLOOD PRESSURE: 71 MMHG | SYSTOLIC BLOOD PRESSURE: 139 MMHG

## 2021-01-09 VITALS — DIASTOLIC BLOOD PRESSURE: 76 MMHG | SYSTOLIC BLOOD PRESSURE: 153 MMHG

## 2021-01-09 VITALS — DIASTOLIC BLOOD PRESSURE: 64 MMHG | SYSTOLIC BLOOD PRESSURE: 95 MMHG

## 2021-01-09 VITALS — DIASTOLIC BLOOD PRESSURE: 69 MMHG | SYSTOLIC BLOOD PRESSURE: 136 MMHG

## 2021-01-09 VITALS — SYSTOLIC BLOOD PRESSURE: 153 MMHG | DIASTOLIC BLOOD PRESSURE: 76 MMHG

## 2021-01-09 LAB
% BANDS: 4 % (ref 0–9)
% LYMPHS: 13 % (ref 24–48)
% METAS: 1 % (ref 0–0)
% MONOS: 2 % (ref 0–10)
% SEGS: 80 % (ref 35–66)
ANION GAP SERPL CALC-SCNC: 5 MMOL/L (ref 6–14)
BASOPHILS # BLD AUTO: 0 X10^3/UL (ref 0–0.2)
BASOPHILS NFR BLD: 0 % (ref 0–3)
BUN SERPL-MCNC: 19 MG/DL (ref 7–20)
CALCIUM SERPL-MCNC: 8.4 MG/DL (ref 8.5–10.1)
CHLORIDE SERPL-SCNC: 102 MMOL/L (ref 98–107)
CO2 SERPL-SCNC: 29 MMOL/L (ref 21–32)
CREAT SERPL-MCNC: 0.8 MG/DL (ref 0.6–1)
EOSINOPHIL NFR BLD: 0 % (ref 0–3)
EOSINOPHIL NFR BLD: 0 X10^3/UL (ref 0–0.7)
ERYTHROCYTE [DISTWIDTH] IN BLOOD BY AUTOMATED COUNT: 13.9 % (ref 11.5–14.5)
GFR SERPLBLD BASED ON 1.73 SQ M-ARVRAT: 72.2 ML/MIN
GLUCOSE SERPL-MCNC: 146 MG/DL (ref 70–99)
HCT VFR BLD CALC: 35.6 % (ref 36–47)
HGB BLD-MCNC: 11.8 G/DL (ref 12–15.5)
LYMPHOCYTES # BLD: 1.2 X10^3/UL (ref 1–4.8)
LYMPHOCYTES NFR BLD AUTO: 10 % (ref 24–48)
MCH RBC QN AUTO: 29 PG (ref 25–35)
MCHC RBC AUTO-ENTMCNC: 33 G/DL (ref 31–37)
MCV RBC AUTO: 86 FL (ref 79–100)
MONO #: 0.5 X10^3/UL (ref 0–1.1)
MONOCYTES NFR BLD: 4 % (ref 0–9)
NEUT #: 10.5 X10^3/UL (ref 1.8–7.7)
NEUTROPHILS NFR BLD AUTO: 86 % (ref 31–73)
PLATELET # BLD AUTO: 304 X10^3/UL (ref 140–400)
PLATELET # BLD EST: ADEQUATE 10*3/UL
POTASSIUM SERPL-SCNC: 4.2 MMOL/L (ref 3.5–5.1)
RBC # BLD AUTO: 4.13 X10^6/UL (ref 3.5–5.4)
SODIUM SERPL-SCNC: 136 MMOL/L (ref 136–145)
WBC # BLD AUTO: 12.3 X10^3/UL (ref 4–11)

## 2021-01-09 PROCEDURE — 0PSL04Z REPOSITION LEFT ULNA WITH INTERNAL FIXATION DEVICE, OPEN APPROACH: ICD-10-PCS | Performed by: ORTHOPAEDIC SURGERY

## 2021-01-09 RX ADMIN — HYDROMORPHONE HYDROCHLORIDE PRN MG: 2 INJECTION INTRAMUSCULAR; INTRAVENOUS; SUBCUTANEOUS at 03:57

## 2021-01-09 RX ADMIN — FENTANYL CITRATE PRN MCG: 50 INJECTION INTRAMUSCULAR; INTRAVENOUS at 10:19

## 2021-01-09 RX ADMIN — FENTANYL CITRATE PRN MCG: 50 INJECTION INTRAMUSCULAR; INTRAVENOUS at 10:45

## 2021-01-09 NOTE — NUR
Pt. discharged to home, verbalized understanding of discharge instructions. L arm drsg and 
charleneing in tact.

## 2021-01-09 NOTE — SSS
ADMIT DATE:  01/09/2021



HOSPITAL SUMMARY:  This is a 65-year-old white female who came in with a fall at

work and suffered a left olecranon fracture, which was repaired by Dr. Hagen on

01/09.  Labs and her medical status is stable and she is comfortable and wishes

to be followed as an outpatient at this point.  She will be discharged if it is

okay with Dr. Estrada at this point and follow up with Dr. Chavez 1-2 weeks

regarding the question of low blood pressure meds and Dr. Estrada as she requests

for surgical followup as well.



FINAL DIAGNOSIS:  Traumatic fracture of the left olecranon.



OPERATIONS AND PROCEDURES:  Left elbow open reduction and internal fixation.



COMPLICATIONS:  None.



CONSULTATIONS:  Dr. Estrada.



DISPOSITION:  Home meds remain the same.  Opioid med per .  Activity as

tolerated and see Dr. Chavez 1-2 weeks for medical followup as well.

 



______________________________

BESSIE SAINZ MD



DR:  EARL/becky  JOB#:  [id number] / [id number]

DD:  01/09/2021 13:09  DT:  01/09/2021 14:49

## 2021-01-09 NOTE — NUR
Pt. back from PACU via bed. Rates pain at 4/10, LUE CDI, ice pack applied. Pt. drowsy, 
easily arousable,  at bedside.

## 2021-01-09 NOTE — PDOC
Provider Note


Date of Service:


DATE: 1/9/21 


TIME: 13:08


Provider Note


[id number]





Justifications for Admission


Other Justification














BESSIE SAINZ MD              Jan 9, 2021 13:10

## 2021-01-09 NOTE — PDOC
Provider Note


Date of Service:


DATE: 1/9/21 


TIME: 09:35


Provider Note


Patient may be discharged home later today.  PT and OT to get her up and 

ambulatory before discharge.  I sent prescriptions to [facility].  See discharge 

paperwork.





Justifications for Admission


Other Justification














NING SUNHSINE MD                  Jan 9, 2021 09:36

## 2021-01-09 NOTE — PDOC4
Operative Note


Operative Note


Date of Procedure: March 4, 2020


Pre-Op Diagnosis:  Displaced fracture of olecranon process with intraarticular 

extension of left ulna, initial encounter for closed fracture - S52.032A


Post-Op Diagnosis:  Displaced fracture of olecranon process with intraarticular 

extension of left ulna, initial encounter for closed fracture - S52.032A


Procedure:  Open treatment of ulnar fracture, proximal end, olecranon process, 

with internal fixation CPT 73793


Surgeon: Ning Estrada MD


Assistant: ODESSA Huston


Anesthesia:  General


EBL: 50 mL


Specimens Obtained:  none


Complications:  none


Drains: none


Findings: displaced olecranon fracture, mild comminution


Tourniquet time: 33 minutes at 250 mmHg


Implants: Synthes VA-LCP Olecranon Plates 2.7/3.5 with associated screws





Indications for Procedure:  This patient is a 65-year-old with a recent fall and

a closed left olecranon fracture which is displaced and intra-articular.  I 

recommended open treatment with internal fixation.  We discussed the potential 

risks of surgery such as ulnar nerve injury, malunion or nonunion, possible need

for hardware removal, loss of motion, arthritis, infection, bleeding, stiffness,

or other potential surgical or anesthetic complications.  All of her questions 

about surgery were answered and he desires to proceed.





Procedure in Detail: The patient was identified in the preoperative holding 

area.  The correct left upper extremity was marked by me.  The patient was taken

to the operating room where general anesthetic was used.  The patient was 

positioned lateral on the operating table using a beanbag, with the bony 

prominences well-padded, and with an axillary roll. 


Preoperative antibiotics were given intravenously.  A timeout procedure was 

performed.





The limb was prepared in sterile fashion with ChloraPrep solution and sterile 

drapes were applied.   An impervious stockinette and Coban were used over the 

lower part of the arm.  A sterile tourniquet was applied to the upper portion of

the limb.   An Esmarch bandage was used to exsanguinate the limb and the 

tourniquet was inflated to 250 mmHg.  A posterior midline incision was used.  

Sharp dissection was used and Bovie electrocautery was used as needed for 

hemostasis.  The ulnar nerve was palpated, and carefully avoided during the 

surgery.  The fracture was displaced and easily identified, and had fracture 

hematoma.  I used curettes, rongeurs, and copious irrigation to flush out the 

joint and the fracture, and remove fracture hematoma and some small bits of 

cartilage from the fracture.  I then used tenaculum bone clamps, and reduced the

fracture manually by extending the elbow and with digital manipulation of the 

fragments, and then the bone clamps were able to hold the fracture reduced 

anatomically by palpation and image review.  





For internal fixation I used Synthes olecranon variable angle locking plate 

fixation. A small image intensifier was used throughout the procedure.  All of 

the images were interpreted intraoperatively by me.  A plate was applied, and 

provisionally attached to the bone with K wires.  Positioning of the plate was 

confirmed on the image intensifier.  Nonlocking screws were placed first, into 

the proximal and distal fragments to secure the plate to the bone.  The 

metaphyseal screw was used to compress the fracture.  The homerun screw was used

through the proximal aspect of the plate.  Additional locking screws were now 

placed proximally and distally.  Satisfactory reduction and fixation was 

achieved and confirmed on the image intensifier.  The fracture was no longer 

palpable and was anatomically reduced.





Copious saline irrigation was used.  The tourniquet was released.  Bovie 

electrocautery was used for hemostasis.  The skin edges were injected with 30 

mL's of 0.25% bupivacaine with epinephrine.  The incision was closed in layers 

with #0 Vicryl applied by me.  My assistant completed the closure with the #3-0 

Vicryl and staples.  Xeroform and a posterior well-padded dressing and a 

posterior splint were applied.  The dressing included Xeroform, 4 x 4's, ABD, 

soft roll, Ortho-Glass splint, and Ace wrap.  There were no apparent 

complications.  Needle and sponge counts were correct.  The patient returned to 

the recovery room in stable condition.











NING ESTRADA MD                  Jan 9, 2021 09:32

## 2021-09-23 ENCOUNTER — HOSPITAL ENCOUNTER (OUTPATIENT)
Dept: HOSPITAL 61 - MAMMO | Age: 66
End: 2021-09-23
Attending: FAMILY MEDICINE
Payer: COMMERCIAL

## 2021-09-23 DIAGNOSIS — Z12.31: Primary | ICD-10-CM

## 2021-09-23 PROCEDURE — 77063 BREAST TOMOSYNTHESIS BI: CPT

## 2021-09-23 PROCEDURE — 77067 SCR MAMMO BI INCL CAD: CPT

## 2021-09-23 NOTE — RAD
MG BILAT SCREEN+ZAHRA 9/23/2021 7:28 AM



INDICATION: Asymptomatic screening mammogram.



COMPARISON: 7/30/2020, 10/10/2019



TECHNIQUE: 3D tomosynthesis was performed in CC and MLO projections. 2D views were obtained from the 
3D data. CAD was utilized as needed.



FINDINGS:



Breast density: Category B: There are scattered areas of fibroglandular density.



Right breast: There are no suspicious microcalcifications, masses or areas of architectural distortio
n.



Left breast: There are no suspicious microcalcifications, masses or areas of architectural distortion
.



Bilateral mammogram is compared to prior examinations appears unchanged.



IMPRESSION:



Negative bilateral mammogram.



BI-RADS category: 1; Negative



Recommendations: Recommend annual screening mammography in one year. 



Electronically signed by: Jen Larios MD (9/23/2021 3:07 PM) [id number]

## 2021-09-30 ENCOUNTER — HOSPITAL ENCOUNTER (OUTPATIENT)
Dept: HOSPITAL 61 - LAB | Age: 66
End: 2021-09-30
Attending: FAMILY MEDICINE
Payer: COMMERCIAL

## 2021-09-30 DIAGNOSIS — E78.2: ICD-10-CM

## 2021-09-30 DIAGNOSIS — R73.03: Primary | ICD-10-CM

## 2021-09-30 DIAGNOSIS — R53.83: ICD-10-CM

## 2021-09-30 DIAGNOSIS — Z86.39: ICD-10-CM

## 2021-09-30 LAB
ALBUMIN SERPL-MCNC: 3.7 G/DL (ref 3.4–5)
ALBUMIN/GLOB SERPL: 0.9 {RATIO} (ref 1–1.7)
ALP SERPL-CCNC: 97 U/L (ref 46–116)
ALT SERPL-CCNC: 73 U/L (ref 14–59)
ANION GAP SERPL CALC-SCNC: 6 MMOL/L (ref 6–14)
AST SERPL-CCNC: 35 U/L (ref 15–37)
BASOPHILS # BLD AUTO: 0.1 X10^3/UL (ref 0–0.2)
BASOPHILS NFR BLD: 1 % (ref 0–3)
BILIRUB SERPL-MCNC: 0.4 MG/DL (ref 0.2–1)
BUN SERPL-MCNC: 17 MG/DL (ref 7–20)
BUN/CREAT SERPL: 21 (ref 6–20)
CALCIUM SERPL-MCNC: 9.5 MG/DL (ref 8.5–10.1)
CHLORIDE SERPL-SCNC: 102 MMOL/L (ref 98–107)
CHOLEST SERPL-MCNC: 133 MG/DL (ref 0–200)
CHOLEST/HDLC SERPL: 2.8 {RATIO}
CO2 SERPL-SCNC: 30 MMOL/L (ref 21–32)
CREAT SERPL-MCNC: 0.8 MG/DL (ref 0.6–1)
EOSINOPHIL NFR BLD: 0.2 X10^3/UL (ref 0–0.7)
EOSINOPHIL NFR BLD: 2 % (ref 0–3)
ERYTHROCYTE [DISTWIDTH] IN BLOOD BY AUTOMATED COUNT: 14 % (ref 11.5–14.5)
GFR SERPLBLD BASED ON 1.73 SQ M-ARVRAT: 72 ML/MIN
GLUCOSE SERPL-MCNC: 116 MG/DL (ref 70–99)
HCT VFR BLD CALC: 38.4 % (ref 36–47)
HDLC SERPL-MCNC: 47 MG/DL (ref 40–60)
HGB BLD-MCNC: 12.8 G/DL (ref 12–15.5)
LDLC: 72 MG/DL (ref 0–100)
LYMPHOCYTES # BLD: 2.4 X10^3/UL (ref 1–4.8)
LYMPHOCYTES NFR BLD AUTO: 29 % (ref 24–48)
MCH RBC QN AUTO: 29 PG (ref 25–35)
MCHC RBC AUTO-ENTMCNC: 33 G/DL (ref 31–37)
MCV RBC AUTO: 86 FL (ref 79–100)
MONO #: 0.9 X10^3/UL (ref 0–1.1)
MONOCYTES NFR BLD: 11 % (ref 0–9)
NEUT #: 4.8 X10^3/UL (ref 1.8–7.7)
NEUTROPHILS NFR BLD AUTO: 57 % (ref 31–73)
PLATELET # BLD AUTO: 313 X10^3/UL (ref 140–400)
POTASSIUM SERPL-SCNC: 4.3 MMOL/L (ref 3.5–5.1)
PROT SERPL-MCNC: 7.9 G/DL (ref 6.4–8.2)
RBC # BLD AUTO: 4.46 X10^6/UL (ref 3.5–5.4)
SODIUM SERPL-SCNC: 138 MMOL/L (ref 136–145)
T4 FREE SERPL-MCNC: 1.29 NG/DL (ref 0.76–1.46)
THYROID STIM HORMONE (TSH): 1.56 UIU/ML (ref 0.36–3.74)
TRIGL SERPL-MCNC: 68 MG/DL (ref 0–150)
VLDLC: 14 MG/DL (ref 0–40)
WBC # BLD AUTO: 8.5 X10^3/UL (ref 4–11)

## 2021-09-30 PROCEDURE — 80061 LIPID PANEL: CPT

## 2021-09-30 PROCEDURE — 84443 ASSAY THYROID STIM HORMONE: CPT

## 2021-09-30 PROCEDURE — 85025 COMPLETE CBC W/AUTO DIFF WBC: CPT

## 2021-09-30 PROCEDURE — 36415 COLL VENOUS BLD VENIPUNCTURE: CPT

## 2021-09-30 PROCEDURE — 80053 COMPREHEN METABOLIC PANEL: CPT

## 2021-09-30 PROCEDURE — 84439 ASSAY OF FREE THYROXINE: CPT

## 2021-09-30 PROCEDURE — 83036 HEMOGLOBIN GLYCOSYLATED A1C: CPT

## 2021-09-30 PROCEDURE — 82306 VITAMIN D 25 HYDROXY: CPT

## 2021-10-01 LAB — HBA1C MFR BLD: 6.3 % (ref 4.8–5.6)

## 2021-10-12 ENCOUNTER — HOSPITAL ENCOUNTER (OUTPATIENT)
Dept: HOSPITAL 61 - KCIC DEXA | Age: 66
End: 2021-10-12
Attending: FAMILY MEDICINE
Payer: COMMERCIAL

## 2021-10-12 ENCOUNTER — HOSPITAL ENCOUNTER (OUTPATIENT)
Dept: HOSPITAL 61 - KCIC CT | Age: 66
End: 2021-10-12
Attending: FAMILY MEDICINE
Payer: COMMERCIAL

## 2021-10-12 DIAGNOSIS — K44.9: ICD-10-CM

## 2021-10-12 DIAGNOSIS — Z12.2: Primary | ICD-10-CM

## 2021-10-12 DIAGNOSIS — Z13.820: Primary | ICD-10-CM

## 2021-10-12 DIAGNOSIS — I34.0: ICD-10-CM

## 2021-10-12 DIAGNOSIS — F17.210: ICD-10-CM

## 2021-10-12 DIAGNOSIS — Z78.0: ICD-10-CM

## 2021-10-12 PROCEDURE — 71271 CT THORAX LUNG CANCER SCR C-: CPT

## 2021-10-12 PROCEDURE — 77080 DXA BONE DENSITY AXIAL: CPT

## 2021-10-12 NOTE — KCIC
EXAM: CT CHEST WITHOUT CONTRAST (LDCT LUNG CANCER SCREENING).



HISTORY: Risk factors for pulmonary malignancy. Lung cancer screening. Cigarette smoking.



TECHNIQUE: CT of the chest was performed without intravenous contrast using a low-dose lung screening
 protocol. Findings analysis is based on ACR Lung-RADS v1.1. *One or more of the following individual
ized dose reduction techniques were utilized for this examination:  

1. Automated exposure control.  

2. Adjustment of the mA and/or kV according to patient size.  

3. Use of iterative reconstruction technique.



COMPARISON: None. 



FINDINGS: The heart is normal in size. There is calcification of the mitral valve annulus. No patholo
gically enlarged lymph node is seen. There is no infiltrate, pleural effusion or pneumothorax. There 
is no infiltrate or suspicious pulmonary nodule. There is a small hiatal hernia. There is no acute fi
nding involving the upper abdomen. There is no acute or suspicious osseous finding.



IMPRESSION:

1. No suspicious pulmonary nodule. Lung RADS category 1: Annual low-dose CT lung cancer screening is 
recommended.

2. No acute thoracic finding.



Electronically signed by: Isidra Ibrahim MD (10/12/2021 8:41 AM) [facility]

## 2021-10-12 NOTE — KCIC
EXAM: DUAL ENERGY X-RAY ABSORPTIOMETRY (DEXA).



HISTORY: Postmenopausal screening.



FINDINGS: The lowest measured T-score is -1.0 in the lumbar spine, based on a bone mineral density of
 0.938 g/cm^2. Refer to the worksheets for full detail.



No comparison examinations are available. 



IMPRESSION:

Normal. Bone mineral density yields a T-score of -1.0 or greater. Fracture risk is low.



FRAX was not calculated.



METHODOLOGY: Dual energy x-ray absorptiometry was performed to measure bone mineral density. The foll
owing analysis is based on the 2019 Official Positions of the International Society for Clinical Dens
itometry: 



Measurements of the hips and the average of L1-L4 are preferred. When the spine and/or hip cannot be 
feasibly measured or interpreted, or in the setting of hyperparathyroidism, distal radial bone minera
l density may be measured. 



The lumbar spine T-score is based on the average bone mineral density of L1-L4. In the setting of art
ifact or anatomic abnormality, some lumbar levels may be excluded, and the remaining levels used for 
calculation. A single lumbar level is not used for diagnosis, and if only a single level is available
 for assessment, another anatomic site will be used to assign a diagnosis.



The hip T-score is based on the bone mineral density measurement of the femoral neck or total proxima
l femur of either side, whichever is lowest. Bilateral mean values are not used for diagnosis.



The forearm T-score is derived from 33% of the distal radius of the nondominant forearm.



For postmenopausal and perimenopausal women, and men age 50 or older, of all ethnic groups, T-scores 
are calculated through comparison of the current measurement with the NHANES III database standard fo
r  females aged 20-29 years. The lowest T-score of the evaluated anatomic sites is used to a
ssign a diagnosis based on the World Health Organization densitometric classification. 



In premenopausal females and males younger than age 50, a Z-score is calculated based on population s
pecific reference data for patient sex and self-reported ethnicity.



Electronically signed by: WILLY Abdul MD (10/12/2021 9:58 AM) [id number]

## 2021-10-13 ENCOUNTER — HOSPITAL ENCOUNTER (OUTPATIENT)
Dept: HOSPITAL 61 - LAB | Age: 66
End: 2021-10-13
Attending: INTERNAL MEDICINE
Payer: COMMERCIAL

## 2021-10-13 DIAGNOSIS — U07.1: Primary | ICD-10-CM

## 2021-10-13 PROCEDURE — U0003 INFECTIOUS AGENT DETECTION BY NUCLEIC ACID (DNA OR RNA); SEVERE ACUTE RESPIRATORY SYNDROME CORONAVIRUS 2 (SARS-COV-2) (CORONAVIRUS DISEASE [COVID-19]), AMPLIFIED PROBE TECHNIQUE, MAKING USE OF HIGH THROUGHPUT TECHNOLOGIES AS DESCRIBED BY CMS-2020-01-R: HCPCS

## 2021-11-09 ENCOUNTER — HOSPITAL ENCOUNTER (OUTPATIENT)
Dept: HOSPITAL 61 - SPEC | Age: 66
End: 2021-11-09
Attending: FAMILY MEDICINE
Payer: COMMERCIAL

## 2021-11-09 DIAGNOSIS — L81.9: Primary | ICD-10-CM

## 2021-11-09 PROCEDURE — 88305 TISSUE EXAM BY PATHOLOGIST: CPT

## 2021-11-11 NOTE — PATHOLOGY
[facility] Accession Number: [id number]

.                                                                01

Material submitted:                                        .

shoulder - L SHOULDER, R/O ATYPIA. Modifiers: left

.                                                                01

Clinician provided ICD-10:

L81.9

.                                                                02

**********************************************************************

Diagnosis:

Skin, left shoulder biopsy:

- Seborrheic keratosis.

(JPM:leila; 11/11/2021)

QMS  11/11/2021  0856 Local

**********************************************************************

.                                                                02

Comment:

There is no evidence of malignancy.

(JPM:leila; 11/11/2021)

.                                                                02

Electronically signed:                                     .

Ron Zhang MD, Pathologist

NPI- [id number]

.                                                                01

Gross description:                                         .

The specimen is submitted in formalin, labeled "Loftiss, Lizeth". The site

is not stated on the specimen container. The site is stated on the

requisition as "L shoulder, rule out atypia".  Received is a shave biopsy

measuring 1.0 x 0.4 x 0.3 cm in greatest dimensions. The epidermal surface

displays a poorly defined, raised, flaky, and dark tan lesion measuring

0.9 x 0.4 x 0.2 cm. The surgical margin is inked. The specimen is bisected

and placed in cassette A1.([facility]; 11/10/2021)

[facility]  11/10/2021  1024 Local

.                                                                02

Pathologist provided ICD-10:

L82.1

.                                                                02

CPT                                                        .

705249

Specimen Comment: A courtesy copy of this report has been sent to [phone]

Specimen Comment: Report sent to 

***Performed at:  01

   [address], [place], [place]  [id number]

   MD Armin Mchugh MD Phone:  [phone]

***Performed at:  02

   [facility]

   [address], [place]  [id number]

   MD Ron Zhang MD Phone:  [phone]